# Patient Record
Sex: FEMALE | Race: WHITE | NOT HISPANIC OR LATINO | Employment: OTHER | URBAN - METROPOLITAN AREA
[De-identification: names, ages, dates, MRNs, and addresses within clinical notes are randomized per-mention and may not be internally consistent; named-entity substitution may affect disease eponyms.]

---

## 2019-05-30 ENCOUNTER — TELEPHONE (OUTPATIENT)
Dept: GASTROENTEROLOGY | Facility: CLINIC | Age: 59
End: 2019-05-30

## 2019-05-31 ENCOUNTER — TELEPHONE (OUTPATIENT)
Dept: GASTROENTEROLOGY | Facility: CLINIC | Age: 59
End: 2019-05-31

## 2019-06-14 ENCOUNTER — TELEPHONE (OUTPATIENT)
Dept: GASTROENTEROLOGY | Facility: CLINIC | Age: 59
End: 2019-06-14

## 2023-09-20 DIAGNOSIS — Z12.31 ENCOUNTER FOR SCREENING MAMMOGRAM FOR MALIGNANT NEOPLASM OF BREAST: Primary | ICD-10-CM

## 2023-11-08 ENCOUNTER — TELEPHONE (OUTPATIENT)
Dept: SLEEP MEDICINE | Facility: CLINIC | Age: 63
End: 2023-11-08
Payer: MEDICAID

## 2024-12-16 ENCOUNTER — TELEPHONE (OUTPATIENT)
Dept: PODIATRY | Facility: CLINIC | Age: 64
End: 2024-12-16
Payer: MEDICARE

## 2024-12-16 NOTE — TELEPHONE ENCOUNTER
Called and spoke to patient. Scheduled appointment with patient for 12/18/24. Patient understood date, time, and location.

## 2024-12-18 ENCOUNTER — PATIENT MESSAGE (OUTPATIENT)
Dept: PODIATRY | Facility: CLINIC | Age: 64
End: 2024-12-18
Payer: MEDICARE

## 2024-12-18 ENCOUNTER — OFFICE VISIT (OUTPATIENT)
Dept: PODIATRY | Facility: CLINIC | Age: 64
End: 2024-12-18
Payer: MEDICARE

## 2024-12-18 DIAGNOSIS — M79.671 FOOT PAIN, RIGHT: Primary | ICD-10-CM

## 2024-12-18 DIAGNOSIS — S93.431A SYNDESMOTIC DISRUPTION OF RIGHT ANKLE, INITIAL ENCOUNTER: ICD-10-CM

## 2024-12-18 DIAGNOSIS — S82.841A CLOSED BIMALLEOLAR FRACTURE OF RIGHT ANKLE, INITIAL ENCOUNTER: Primary | ICD-10-CM

## 2024-12-18 PROBLEM — Z47.89: Status: ACTIVE | Noted: 2024-12-18

## 2024-12-18 PROCEDURE — 99999 PR PBB SHADOW E&M-EST. PATIENT-LVL IV: CPT | Mod: PBBFAC,,, | Performed by: PODIATRIST

## 2024-12-18 RX ORDER — TRAMADOL HYDROCHLORIDE 50 MG/1
50 TABLET ORAL EVERY 6 HOURS PRN
Qty: 12 TABLET | Refills: 0 | Status: SHIPPED | OUTPATIENT
Start: 2024-12-18 | End: 2024-12-28

## 2024-12-18 NOTE — PATIENT INSTRUCTIONS
Report to the Same Day Surgery unit on 12/20/24     1. DO NOT EAT OR DRINK ANYTHING AFTER THE MIDNIGHT BEFORE SURGERY     2. Do not drink any alcohol or take any mind-altering drugs 24 hours before surgery.     3. STOP TAKING: Coumadin, Plavix, Pletal, Aggrenox, Aspirin, Aleve, Naproxen, Advil, Motrin, Ibuprofen, Violette Bahama, Celebrex, Allopurinol, and any medications containing aspirin or antiinflammatories N/A unless instructed otherwise by your Primary Care Provider     4. You may take Tylenol and your other medications up until midnight before your surgery.     5. Take the following medications the morning of your procedure with a sip of water (less than an ounce): Effexor,Depakote     6. Leave all valuables at home.     7. Bathe like you normally do the morning of or the night before surgery, preferably with an anti-bacterial soap     8. Only 2 visitors will be allowed on the unit with patients. Children under 12 years old are not allowed to visit on unit.     9. YOU WILL NOT BE ALLOWED TO DRIVE HOME AFTER YOUR PROCEDURE!!! A family member or friend must be in the unit with you to receive discharge instructions and to sign the papers for you to be discharged home. Also, you must make arrangements before your surgery day to have a ride home in a private vehicle (no buses or public transportation allowed). YOU WILL NOT BE ALLOWED TO WALK HOME, NOR WILL STAFF BE RESPONSIBLE FOR MAKING THESE ARRANGEMENTS FOR YOU!!! FAILURE TO MAKE THE PROPER ARRANGEMENTS FOR YOURSELF MAY RESULT IN THE CANCELLATION OF YOUR PROCEDURE!    10. Obtain a History & Physical for surgical clearance for your surgery from your Primary Care Provider within 30 days of your date of surgery. Have their office fax us a copy of the H&P. Bring a paper copy of the H&P with you to surgery.      -------------------------------------------------------------------------------------------------------------------------------------------------------------        Ankle Fracture, Distal Fibula  You have a fracture, or broken bone, of the end of the fibula bone. The fibula is one of two bones that support the ankle joint.        Home care  You will be given a splint, cast, or special boot to prevent movement at the injury site. Do not put weight on a splint. It will break. Follow your healthcare provider's advice about when to begin bearing weight on a cast or boot.  Keep your leg elevated when sitting or lying down. When sleeping, place a pillow under the injured leg. When sitting, support the injured leg so it is level with your waist. This is very important during the first 48 hours.  Keep the cast or splint completely dry at all times. When bathing, protect the cast or splint with 2 large plastic bags. Place 1 bag outside of the other. Tape each bag with duct tape at the top end. Water can still leak in even when the foot is covered. So it's best to keep the cast, splint, or boot away from water. If a fiberglass cast or splint gets wet, dry it with a hair dryer on a cool setting.  Place an ice pack over the injured area for no more than 15 to 20 minutes. Do this every 3 to 6 hours for the first 24 to 48 hours. Continue this 3 to 4 times a day as needed. To make an ice pack, put ice cubes in a plastic bag that seals at the top. Wrap the bag in a clean, thin towel or cloth. Never put ice or an ice pack directly on the skin. The ice pack can be put right on the cast or splint. As the ice melts, be careful that the cast or splint doesn't get wet.  You may use over-the-counter pain medicine to control pain, unless another pain medicine was prescribed. If you have chronic liver or kidney disease or ever had a stomach ulcer or GI bleeding, talk with your provider before using these medicines.  Follow-up  care  Follow up with your healthcare provider in 1 week, or as advised. This is to be sure the bone is healing properly. If you were given a splint, it may be changed to a cast after the swelling goes down.  If X-rays were taken, you will be told of any new findings that may affect your care.  When to seek medical advice  Call your healthcare provider right away if any of these occur:  The plaster cast or splint becomes wet or soft  The fiberglass cast or splint stays wet for more than 24 hours  There is increased tightness or pain under the cast or splint  Your toes become swollen, cold, blue, numb, or tingly  The cast becomes loose  The cast has a bad smell  Sore areas develop under the cast  The cast develops cracks or breaks     This information is not intended as a substitute for professional medical care. Always follow your healthcare professional's instructions.      Understanding an Ankle Fracture      The ankle is formed by bones in the lower leg (tibia and fibula) and the bone on top of the foot (talus). When you have a fracture of the ankle, it means that one or more of the bones in the ankle are broken. The bone may be cracked, broken into two or more pieces, or even shattered. The pieces of bone may be lined up or they may have moved out of place. Sometimes, the bone may break through the skin. Nearby ligaments may also be damaged. Depending on how badly the bone is broken, healing may take a few months or longer.   What causes an ankle fracture?  Ankle fractures are often caused from severely twisting or rolling the ankle. They may also be caused from a fall, blow, accident, or sports injury.  Symptoms of an ankle fracture  Symptoms can include pain, swelling, and bruising. If the bone breaks through the skin, bleeding at the site can also occur. The ankle may look crooked, deformed, or bent. Also, it may be hard to move or use the ankle and foot as you would normally.  Treating an ankle  fracture  Treatment for an ankle fracture depends on where the bone is broken and how serious the break is. If needed, the bone is put back into place. This may be done with or without surgery. If surgery is needed, the surgeon may use devices such as pins, plates, or screws to hold the bone together. Usually, you will wear a splint, brace, or short leg cast to keep the bone in place and protect it from injury during healing. Other treatments may also be used to help reduce symptoms or regain function. These include:  Rest. You may need to avoid walking or putting any weight on the broken ankle for a period of time. Severe fractures need a longer limit on weight-bearing activities.  Cold packs. Putting an ice pack over the injured area may help reduce swelling and pain.  Compression. An elastic bandage may be wrapped around the ankle to help reduce swelling.  Elevation. Propping up the ankle so that it is above your heart may ease swelling.  Pain medicines. Prescription or over-the-counter pain medicines may help reduce pain and swelling. If needed, stronger pain medicines may be prescribed.  Exercises. Your healthcare provider may give you certain exercises to do at home or with a physical therapist. These help restore strength, flexibility, and range of motion in the ankle and foot.  Possible complications of an ankle fracture  These can include:  Poor healing of the bone  Weakness, stiffness, or loss of range of motion in the ankle  Osteoarthritis in the ankle  When to call your healthcare provider  Call your healthcare provider right away if you have any of these:  Fever of 100.4°F (38°C) or higher, or as directed  Symptoms that don't get better with treatment, or get worse  Numbness, tingling, or coldness in your foot or toes  Toenails that turn blue or grey in color  A splint, brace, or cast that is damaged or feels too tight or loose  Unusual redness, warmth, swelling, bleeding, or drainage from any wounds or  incision sites  New symptoms     This information is not intended as a substitute for professional medical care. Always follow your healthcare professional's instructions.            Understanding Tibia/Fibula Fracture Open Reduction and Internal Fixation (ORIF)  Open reduction and internal fixation (ORIF) is a type of treatment to fix a broken bone. It puts the pieces of a broken bone back together so they can heal. Open reduction means the bones are put back in place during a surgery. Internal fixation means that special hardware is used to hold the bone pieces together. This helps the bone heal correctly. The procedure is done by an orthopedic surgeon. This is a doctor with special training in treating bone, joint, and muscle problems.  How does a tibia/fibula fracture happen?  The tibia and fibula are the 2 bones of your lower leg. The tibia is your shinbone. It's the larger bone. The fibula is the smaller bone that sits next to the tibia. The top of the tibia forms part of the knee joint. The bottom of both the tibia and the fibula form the upper part of the ankle joint.  An injury may break (fracture) your tibia or your fibula into 2 or more pieces. This might happen near your knee, in the middle of your shin, or near your ankle. A fracture near your ankle may be called a broken ankle. You may have a fracture in 1 or both of the bones. A bone may break but the pieces are still lined up correctly. Or the pieces may not line up correctly. This is called a displaced fracture.  A broken tibia or fibula can happen from a car or bicycle accident, contact sports, a fall, or activities with movements that you do over and over again. Older adults with low bone density (osteoporosis) are more at risk for breaks in these bones.  Why is ORIF done?  During an open reduction, the bone pieces are put back in their proper alignment. The bones are then connected back in place with hardware. This is called internal fixation.  The hardware may include screws, plates, rods, wires, or nails. This helps the bone heal correctly.  Most people do not need ORIF for tibia/fibula fractures. They may have bones put back in place without surgery. They may have pain medicine, a cast, splint, or a special brace, and physical therapy. You are likely to need ORIF if:  You have a displaced fracture.  Part of your tibia or fibula broke through the skin.  Your tibia or fibula broke into several pieces.  Your fracture involves your knee or ankle joint.  You had other treatment, but your fracture did not heal well.  How is a tibia/fibula fracture ORIF done?  The surgery is done by an orthopedic surgeon. The surgeon will make a cut (incision) through the skin and muscle of your leg. He or she will put the pieces of your tibia and fibula back into place (reduction). The pieces of the broken bones will be secured to each other (fixation). Your doctor may use screws, metal plates, wires, or pins. For a fracture in the middle of the tibia, a special metal shiloh may be put through the middle of the bone.  What are the risks of tibia/fibula fracture ORIF?  All surgery has risks. The risks of tibia/fibula fracture ORIF include:  Infection  Bleeding  Nerve damage  Bone healing out of alignment  Blood clots  Fat embolism  Broken screws or plates  Loss of movement  Damage to the bones from the hardware  Skin irritation from the hardware  Misaligned bones  Problems from anesthesia  Need for additional surgery  Your risks vary based on your age and general health. For example, if you are a smoker or if you have low bone density, you may have a higher risk for certain problems. People with diabetes that is not controlled well may also have a higher risk for problems. Talk with your healthcare provider about which risks apply most to you.  This information is not intended as a substitute for professional medical care. Always follow your healthcare professional's  instructions.        Having Tibia/Fibula Fracture Open Reduction and Internal Fixation (ORIF)  Open reduction and internal fixation (ORIF) is a type of treatment to fix a broken bone. It puts the pieces of a broken bone back together so they can heal. Open reduction means the bones are put back in place during a surgery. Internal fixation means that special hardware is used to hold the bone pieces together. This helps the bone heal correctly. The procedure is done by an orthopedic surgeon. This is a doctor with special training in treating bone, joint, and muscle problems.  What to tell your healthcare provider  Tell your healthcare provider about all the medicines you take. This includes over-the-counter medicines such as ibuprofen. It also includes vitamins, herbs, and other supplements. Also tell your provider the last time you had something to eat or drink. And tell your provider if you:  Have had any recent changes in your health, such as an infection or fever  Are sensitive or allergic to any medicines, latex, tape, or anesthesia (local and general)  Are pregnant or think you may be pregnant  Tests before your surgery  You may need an X-ray or other imaging scan to look at your bones.  Getting ready for your surgery  ORIF often takes place as emergency surgery after an accident or injury. Before this procedure, a healthcare provider will ask about your health history and give you a physical exam.  In some cases, femur fracture ORIF is planned. You may need to have your leg placed in traction while you wait for surgery. Traction is a type of sling that holds your leg. Talk with your healthcare provider how to get ready for your surgery. You may need to stop taking some medicines before the procedure, such as blood thinners and aspirin. If you smoke, you may need to stop before your surgery. Smoking can delay healing. Talk with your healthcare provider if you need help to stop smoking.  Also, make sure to:  Ask  a family member or friend to take you home from the hospital. You cannot drive yourself.  Plan some changes at home to help you recover. You may need help at home.  Not eat or drink after midnight the night before your surgery  Follow all other instructions from your healthcare provider  You may be asked to sign a consent form that gives your permission to do the procedure. Read the form carefully. Ask questions if something is not clear.  On the day of surgery  Your surgeon will explain the details of your surgery. These details will depend on the location and severity of your injury. An orthopedic surgeon and a team of specialized nurses will do the surgery. The preparation and surgery may take a couple of hours. In general, you can expect the following:  You will likely have general anesthesia.This will prevent pain and make you sleep through the surgery. Or you may have regional anesthesia to numb the area and medicine to help you relax and sleep through the surgery.  A healthcare provider watches your vital signs, like your heart rate and blood pressure, during the surgery.  After cleaning the skin, the surgeon will make a cut (incision) through the skin and muscle of your leg.  The surgeon will put the pieces of your tibia and fibula back into place (reduction).  He or she will secure the pieces of the broken bones to each other (fixation). The surgeon may use screws, metal plates, wires, or pins. For a fracture in the middle of our tibia, a special metal shiloh may be put through the middle of the bone.  The surgeon will make other repairs to the area as needed.  He or she will close the layers of muscle and skin around your thigh with sutures.  After your surgery  Talk with your surgeon about what you can expect after your surgery. You may go home the same day. Or you may stay overnight in the hospital. Before leaving the hospital, you will likely have X-rays taken of your leg. This is to check the repair.  You  "will have some pain after the surgery. Your doctor will tell you what pain medicine you can take to help reduce the pain. You can also use ice packs to help lessen pain and swelling. You might have some fluid draining from your incision. This is normal.  You'll get instructions about how to move your leg and when you can put weight on it. For a while after your surgery, you may be told not to move your leg. You may need to wear a brace for several weeks. You may need to keep your leg dry.  Follow all of your doctor's instructions carefully. Your surgeon may tell you to:  Take prescription medicine to prevent blood clots  Not take certain over-the-counter medicines for pain that may interfere with bone healing  Eat foods high in calcium and vitamin D to help with bone healing  Follow-up care  Make sure to keep all of your follow-up appointments. You may need to have your stitches removed a week or so after your surgery.  You may have physical therapy to improve the strength and movement of your leg. The therapy may include treatments and exercises. The therapy improves your chances of a full recovery. Most people are able to return to all their normal activities within a few months.  When to call your healthcare provider  Call your healthcare provider right away if you have any of these:  Fever of 100.4°F (38°C) or higher  Redness, swelling, or fluid leaking from your incision that gets worse  Pain in your leg or calf that gets worse  Loss of feeling in your foot or leg     This information is not intended as a substitute for professional medical care. Always follow your healthcare professional's instructions.      Crutch Use    Sizing Crutches    Even if you have already been fitted for crutches, make sure your crutch pads and handgrips are set at the proper distance, as follows:          Crutch pad distance from armpits: The crutch pads (tops of crutches) should be 1½" to 2" (about two finger widths) below the " "armpits, with the shoulders relaxed.  Handgrip: Place it so your elbow is slightly bent--enough so you can fully extend your elbow when you take a step.  Crutch length (top to bottom): The total crutch length should equal the distance from your armpit to about 6" in front of a shoe.     Begin in the Tripod Position  The tripod position is the position in which you stand when using crutches. It is also the position in which you begin walking. To get into the tripod position, place the crutch tips about 4" to 6" to the side and in front of each foot. Stand on your good foot (the one that is weightbearing).        Walking with Crutches  (Nonweightbearing)    If your foot and ankle surgeon has told you to avoid all weightbearing, you will need sufficient upper-body strength to support all your weight with just your arms and shoulders.    Begin in the tripod position, remembering to keep all your weight on your good (weightbearing) foot.  Advance both crutches and the affected foot/leg.  Move the good weightbearing foot/leg forward (beyond the crutches).  Advance both crutches and then the affected foot/leg.  Repeat Steps 3 and 4.     Managing Chairs with Crutches  To get into and out of a chair safely:    Make sure the chair is stable and will not roll or slide. It must have arms and back support.  Stand with the backs of your legs touching the front of the seat.  Place both crutches in one hand, grasping them by the handgrips.  Hold on to the crutches (on one side) and the chair arm (on the other side) for balance and stability while lowering yourself to a seated position or raising yourself from the chair to stand up.     Managing Stairs without Crutches  The safest way to go up and down stairs is to use your seat, not your crutches.    To go up stairs:  Seat yourself on a low step.  Move your crutches upstairs by one of these methods:  If distance and reach allow, place the crutches at the top of the staircase.  If " "this is not possible, place crutches as far up the stairs as you can, and then move them to the top as you progress up the stairs. In the seated position, reach behind you with both arms. Use your arms and weightbearing foot/leg to lift yourself up one step. Repeat this process one step at a time. (Remember to move the crutches to the top of the staircase if you have not already done so.)     To go down stairs:  Seat yourself on the top step. Move your crutches downstairs by sliding them to the lowest possible point on the stairway. Then continue to move them down as you progress down the stairs. In the seated position, reach behind you with both arms. Use your arms and weightbearing foot/leg to lift yourself down one step. Repeat this process one step at a time. (Remember to move the crutches to the bottom of the staircase if you have not already done so.)          IMPORTANT!  Follow These Rules for Safety and Comfort  Dont look down. Look straight ahead as you normally do when you walk.  Dont use crutches if you feel dizzy or drowsy.  Dont walk on slippery surfaces. Avoid snowy, icy or rainy conditions.   Dont put any weight on the affected foot if your doctor has so advised.  Do make sure your crutches have rubber tips.  Do wear well-fitting, low-heel shoes (or shoe).  Do position the crutch hand  correctly (see Sizing Your Crutches)  Do keep the crutch pads 1½" to 2" below your armpits.  Do call your foot and ankle surgeon if you have any questions or difficulties.      "

## 2024-12-18 NOTE — PROGRESS NOTES
[facility] - PODIATRY  [address]  [address]  [place] [address]  Dept: [phone]  Dept Fax: [phone]    Fredis Dorantes Jr., DPM   Fredis Dorantes Jr.     New Patient Visit  Assessment:     1. Closed bimalleolar fracture of right ankle, initial encounter  Ambulatory Referral/Consult to Physical Therapy        1. Closed bimalleolar fracture of right ankle, initial encounter  -     Ambulatory Referral/Consult to Physical Therapy; Future; Expected date: 12/25/2024      Plan:     MDM    Coding    - pt seen evaluated and treated  - discussed surgical and conservative tx options  - all alternatives, risks, benefits of all tx options were discussed in detail  -XR reviewed   Medial mal avulsion fx with lateral oblique fracture that extends above the level of the ankle joint. 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  -clinically she s/s concerning for instability and syndesmosis injury  -given failure of conservative measures/ severity of deformity / severity of injury, we discussed surgical intervention and -pt would benefit from operative intervention: we discussed stress exam under anesthesia with likely ORIF of R ankle fracture and syndesmosis  -given failure of conservative measures, we discussed surgical intervention  -we discussed approx postop course  -would be out pt, elective surgery  -would GA + block, supine position  -would need anesthesia clearance  -labs and xr on way out  -DOS: 12/20/24    - rx dispensed: rxs to be given on day of surgery  - referrals: PT crutch  -I discussed the following treatment options: Surgical options discussed closed reduction of right bimalleolar fracture, open reduction of right bimalleolar fracture  Continue use of assistive devices  Weight bearing restriction: NWB  -Questions solicited and answered  Cast care principles discussed  Fracture care principles discussed  Patient education pamphlet shared and discussed and sent  with patient  Infection precautions reiterated on all surgical patients     Follow up in about 3 weeks (around 1/8/2025).    Subjective:      Patient ID: Sandee Barriga is a 64 y.o. female.    Chief Complaint:   Chief Complaint   Patient presents with    Ankle Injury       DOI: 12/14/24    CC - ankle/foot injury: Patient complains of injury to the right ankle/foot. This is evaluated as a personal injury. The injury occurred a few days ago, and occurred while walking on road.  The patient states the ankle/foot rolled inward at the time of injury.  Patient did hear or sense a pop or snap at the time of the injury. The patient notes pain and moderate swelling of the foot/ankle since the injury. Patient has treated the ankle with ice. Pain is localized to the anterior, medial, lateral area. Patients rates pain 7/10 on pain scale.    Able to ambulate: no    HPI      Outside reports reviewed: historical medical records.  Family hx: as below  Past Medical History:   Diagnosis Date    Arthritis     Asthma      No past surgical history on file.  No family history on file.  Current Outpatient Medications   Medication Sig Dispense Refill    traMADoL (ULTRAM) 50 mg tablet Take 1 tablet (50 mg total) by mouth every 6 (six) hours as needed for Pain. 12 tablet 0     No current facility-administered medications for this visit.     Review of patient's allergies indicates:   Allergen Reactions    Ambien dalton Hallucinations    Iodine Hives     Social History     Socioeconomic History    Marital status:    Tobacco Use    Smoking status: Unknown       ROS    REVIEW OF SYSTEMS: Negative as documented below as well as positive findings in bold.       Constitutional  Respiratory  Gastrointestinal  Skin   - Fever - Cough - Heartburn - Rash   - Chills - Spit blood - Nausea - Itching   - Weight Loss - Shortness of breath - Vomiting - Nail pain   - Malaise/Fatigue - Wheezing - Abdominal Pain  Wound/Ulcer   - Weight Gain   - Blood in  Stool  Poor wound healing       - Diarrhea          Cardiovascular  Genitourinary  Neurological  HEENT   - Chest Pain - Dysuria - Dizziness - Headache   - Palpitations - Hematuria - Tingling - Congestion   - Pain at night in legs - Flank Pain - Tremor - Sore Throat   - Cramping   - Sensory Change - Blurred Vision   - Leg Swelling   - Speech Change - Double Vision   - Dizzy when standing   - Focal Weakness - Eye Redness       - Seizures - Dry Eyes       - Loss of Consciousness          Endocrine  Musculoskeletal  Psychiatric   - Cold intolerance - Muscle Pain - Depression   - Heat intolerance - Neck Pain - Insomnia   - Anemia - Joint Pain - Memory Loss   -  Easy bruising, bleeding - Heel pain - Anxiety      Toe Pain        Leg/Ankle/Foot Pain         Objective:     There were no vitals taken for this visit.    Physical Exam    General Appearance:   Patient appears well developed, well nourished  Patient appears stated age    Psychiatric:   Patient is oriented to time, place, and person  Patient has appropriate mood and affect    Neck:  Trachea Midline  No visible masses    Respiratory/Ears:  No distress or labored breathing.  Able to differentiate between normal talking voice and whisper.  Able to follow commands    Eyes:  Visual Acuity intact  Lids and conjunctivae normal. No discoloration noted.    Foot/Ankle Musculoskeletal Exam  Gait    Antalgic: right    Limp: right    Assistive device: crutches    Inspection    Right      Effusion: moderate        Ecchymosis: large      Palpation    Right      Crepitus: moderate        Tenderness: present          Anterior syndesmosis: moderate          Distal fibula: severe          Proximal fibula: mild          Medial malleolus: moderate      Right Ankle Exam     Tenderness   The patient is experiencing tenderness in the lateral malleolus and medial malleolus.  Swelling: moderate           Foot Exam    Right Foot/Ankle     Tests  Syndesmosis squeeze: positive           R LE  exam con't:  V:  DP 2/4, PT 2/4   CRT< 3s to all digits tested   Tibial and popliteal lymph nodes are w/o abnormality   Edema absent, varicose veins present    N: Patient displays normal ankle reflexes   SILT in SP/DP/T/Darlyn/Saph distributions    Ortho: +Motor EHL/FHL/TA/GA   +TTP medial, lateral and anterior ankle    +TTP syndesmosis    Ecchymosis present lateral leg and ankle   Instability: yes at syndesmosis   Compartments soft/compressible. No pain on passive stretch of big toe. No calf  Pain.    Derm: skin is intact, skin warm and dry, skin without ulcers or lesions, skin without induration, nails normal          Imaging / Labs:    X-Ray Ankle Complete Right    Result Date: 12/14/2024  EXAMINATION: XR ANKLE COMPLETE 3 VIEW RIGHT CLINICAL HISTORY: Unspecified fall, initial encounter TECHNIQUE: AP, lateral, and oblique images of the right ankle were performed. COMPARISON: None FINDINGS: Obliquely oriented fracture through the lateral malleolus extending to the tibial plafond. Mildly displaced fracture at the tip of the medial malleolus. Talar dome appears intact.  Ankle mortise symmetric.  No syndesmotic widening. No large joint effusion. No radiopaque foreign body.  Enthesopathic change at the calcaneus.     Acute appearing bimalleolar fracture, as discussed. Electronically signed by: Antoine Craft Date:    12/14/2024 Time:    10:57        Note: This was dictated using a computer transcription program. Although proofread, it may contain computer transcription errors and phonetic errors. Other human proofreading errors may also exist. Corrections may be performed at a later time. Please contact us for any clarification if needed.    Fredis Dorantes DPM  [facility]

## 2024-12-20 PROBLEM — S93.431A SYNDESMOTIC DISRUPTION OF RIGHT ANKLE: Status: ACTIVE | Noted: 2024-12-20

## 2024-12-20 PROBLEM — S82.841A CLOSED BIMALLEOLAR FRACTURE OF RIGHT ANKLE: Status: ACTIVE | Noted: 2024-12-20

## 2024-12-23 ENCOUNTER — TELEPHONE (OUTPATIENT)
Dept: PODIATRY | Facility: CLINIC | Age: 64
End: 2024-12-23
Payer: MEDICARE

## 2024-12-23 NOTE — TELEPHONE ENCOUNTER
Called patient, no answer. Left voicemail stating medication is being transferred from [facility] to [facility] pharmacy now. Provided callback number.

## 2024-12-26 ENCOUNTER — TELEPHONE (OUTPATIENT)
Dept: PODIATRY | Facility: CLINIC | Age: 64
End: 2024-12-26
Payer: MEDICARE

## 2024-12-26 NOTE — TELEPHONE ENCOUNTER
Called patient back in regards to message below and spoke to patient. Patient stated that she cannot make it to appt on the 31st, patient stated there is not medical transportation and her kids are unable to bring her so she has no ride. Informed her that I will still keep the appointment just in case she is able to make it and I will speak to Dr. Dorantes once he gets back in office on the 31st to see if he needs to see her earlier. Patient understood.       What is the request in detail:  patient request call back in reference to reschedule appointment Please contact to further discuss and advise      Can the clinic reply by [facility]:     What Number to Call Back [facility]:   [phone]

## 2024-12-27 ENCOUNTER — TELEPHONE (OUTPATIENT)
Dept: PODIATRY | Facility: CLINIC | Age: 64
End: 2024-12-27
Payer: MEDICARE

## 2024-12-31 ENCOUNTER — OFFICE VISIT (OUTPATIENT)
Dept: PODIATRY | Facility: CLINIC | Age: 64
End: 2024-12-31
Payer: MEDICARE

## 2024-12-31 VITALS — BODY MASS INDEX: 32.06 KG/M2 | RESPIRATION RATE: 18 BRPM | HEIGHT: 65 IN | WEIGHT: 192.44 LBS

## 2024-12-31 DIAGNOSIS — Z47.89 AFTERCARE FOLLOWING SURGERY OF THE MUSCULOSKELETAL SYSTEM: Primary | ICD-10-CM

## 2024-12-31 DIAGNOSIS — S93.431A SYNDESMOTIC DISRUPTION OF RIGHT ANKLE, INITIAL ENCOUNTER: ICD-10-CM

## 2024-12-31 PROCEDURE — 1101F PT FALLS ASSESS-DOCD LE1/YR: CPT | Mod: CPTII,S$GLB,, | Performed by: PODIATRIST

## 2024-12-31 PROCEDURE — 1159F MED LIST DOCD IN RCRD: CPT | Mod: CPTII,S$GLB,, | Performed by: PODIATRIST

## 2024-12-31 PROCEDURE — 99024 POSTOP FOLLOW-UP VISIT: CPT | Mod: S$GLB,,, | Performed by: PODIATRIST

## 2024-12-31 PROCEDURE — 99999 PR PBB SHADOW E&M-EST. PATIENT-LVL IV: CPT | Mod: PBBFAC,,, | Performed by: PODIATRIST

## 2024-12-31 PROCEDURE — 3288F FALL RISK ASSESSMENT DOCD: CPT | Mod: CPTII,S$GLB,, | Performed by: PODIATRIST

## 2024-12-31 RX ORDER — HYDROCODONE BITARTRATE AND ACETAMINOPHEN 5; 325 MG/1; MG/1
1 TABLET ORAL EVERY 6 HOURS PRN
Qty: 25 TABLET | Refills: 0 | Status: SHIPPED | OUTPATIENT
Start: 2024-12-31

## 2024-12-31 NOTE — PATIENT INSTRUCTIONS
R.I.C.E.    R.I.C.E. stands for Rest, Ice, Compression, and Elevation. Doing these things helps limit pain and swelling after an injury. R.I.C.E. also helps injuries heal faster. Use R.I.C.E. for sprains, strains, and severe bruises or bumps. Follow the tips on this handout and begin R.I.C.E. as soon as possible after an injury.     Rest  Pain is your body's way of telling you to rest an injured area. Whether you have hurt an elbow, hand, foot, or knee, limiting its use will prevent further injury and help you heal.     Ice  Applying ice right after an injury helps prevent swelling and reduce pain. Don't place ice directly on your skin.  Wrap a cold pack or bag of ice in a thin cloth. Place it over the injured area.  Ice for 10 minutes every 3 hours. Don't ice for more than 20 minutes at a time.     Compression  Putting pressure (compression) on an injury helps prevent swelling and provides support.  Wrap the injured area firmly with an elastic bandage. If your hand or foot tingles, becomes discolored, or feels cold to the touch, the bandage may be too tight. Rewrap it more loosely.  If your bandage becomes too loose, rewrap it.  Do not wear an elastic bandage overnight.    Elevation  Keeping an injury elevated helps reduce swelling, pain, and throbbing. Elevation is most effective when the injury is kept elevated higher than the heart.     Call your healthcare provider if you notice any of the following:  Fingers or toes feel numb, are cold to the touch, or change color  Skin looks shiny or tight  Pain, swelling, or bruising worsens and is not improved with elevation         Bone Healing  How Does a Bone Heal?    All broken bones go through the same healing process. This is true whether a bone has been cut as part of a surgical procedure or fractured through an injury.     The bone healing process has three overlapping stages: inflammation, bone production and bone remodeling.        Inflammation starts immediately  after the bone is fractured and lasts for several days. When the bone is fractured, there is bleeding into the area, leading to inflammation and clotting of blood at the fracture site. This provides the initial structural stability and framework for producing new bone.        Bone production begins when the clotted blood formed by inflammation is replaced with fibrous tissue and cartilage (known as soft callus). As healing progresses, the soft callus is replaced with hard bone (known as hard callus), which is visible on x-rays several weeks after the fracture.        Bone remodeling, the final phase of bone healing, goes on for several months. In remodeling, bone continues to form and becomes compact, returning to its original shape. In addition, blood circulation in the area improves. Once adequate bone healing has occurred, weightbearing (such as standing or walking) encourages bone remodeling.?  How Long Does Bone Healing Take?   Bone generally takes 6 to 12 weeks to heal to a significant degree. In general, children's bones heal faster than those of adults. The foot and ankle surgeon will determine when the patient is ready to bear weight on the area. This will depend on the location and severity of the fracture, the type of surgical procedure performed and other considerations.    What Helps Promote Bone Healing?  If a bone will be cut during a planned surgical procedure, some steps can be taken pre- and postoperatively to help optimize healing. The surgeon may offer advice on diet and nutritional supplements that are essential to bone growth. Smoking cessation and adequate control of blood sugar levels in people living with diabetes are important. Smoking and high glucose levels interfere with bone healing.     For all patients with fractured bones, immobilization is a critical part of treatment because any movement of bone fragments slows down the initial healing process. Depending on the type of fracture or  surgical procedure, the surgeon may use some form of fixation (such as screws, plates or wires) on the fractured bone and/or a cast to keep the bone from moving. During the immobilization period, weightbearing is restricted as instructed by the surgeon.     Once the bone is adequately healed, physical therapy often plays a key role in rehabilitation. An exercise program designed for the patient can help in regaining strength and balance and can assist in returning to normal activities.    What Can Hinder Bone Healing?   A wide variety of factors can slow down the healing process. These include:    Movement of the bone fragments; weightbearing too soon  Smoking, which constricts the blood vessels and decreases circulation  Medical conditions, such as diabetes, hormone-related problems or vascular disease  Some medications, such as corticosteroids and other immunosuppressants  Fractures that are severe, complicated or become infected  Advanced age  Poor nutrition or impaired metabolism  Low levels of calcium and vitamin D     How Can Slow Healing Be Treated?   If the bone is not healing as well as expected or fails to heal, the foot and ankle surgeon can choose from a variety of treatment options to enhance bone growth, such as continued immobilization for a longer period, bone stimulation or surgery with bone grafting or use of bone growth proteins      Understanding an Ankle Fracture      The ankle is formed by bones in the lower leg (tibia and fibula) and the bone on top of the foot (talus). When you have a fracture of the ankle, it means that one or more of the bones in the ankle are broken. The bone may be cracked, broken into two or more pieces, or even shattered. The pieces of bone may be lined up or they may have moved out of place. Sometimes, the bone may break through the skin. Nearby ligaments may also be damaged. Depending on how badly the bone is broken, healing may take a few months or longer.   What  causes an ankle fracture?  Ankle fractures are often caused from severely twisting or rolling the ankle. They may also be caused from a fall, blow, accident, or sports injury.  Symptoms of an ankle fracture  Symptoms can include pain, swelling, and bruising. If the bone breaks through the skin, bleeding at the site can also occur. The ankle may look crooked, deformed, or bent. Also, it may be hard to move or use the ankle and foot as you would normally.  Treating an ankle fracture  Treatment for an ankle fracture depends on where the bone is broken and how serious the break is. If needed, the bone is put back into place. This may be done with or without surgery. If surgery is needed, the surgeon may use devices such as pins, plates, or screws to hold the bone together. Usually, you will wear a splint, brace, or short leg cast to keep the bone in place and protect it from injury during healing. Other treatments may also be used to help reduce symptoms or regain function. These include:  Rest. You may need to avoid walking or putting any weight on the broken ankle for a period of time. Severe fractures need a longer limit on weight-bearing activities.  Cold packs. Putting an ice pack over the injured area may help reduce swelling and pain.  Compression. An elastic bandage may be wrapped around the ankle to help reduce swelling.  Elevation. Propping up the ankle so that it is above your heart may ease swelling.  Pain medicines. Prescription or over-the-counter pain medicines may help reduce pain and swelling. If needed, stronger pain medicines may be prescribed.  Exercises. Your healthcare provider may give you certain exercises to do at home or with a physical therapist. These help restore strength, flexibility, and range of motion in the ankle and foot.  Possible complications of an ankle fracture  These can include:  Poor healing of the bone  Weakness, stiffness, or loss of range of motion in the  ankle  Osteoarthritis in the ankle  When to call your healthcare provider  Call your healthcare provider right away if you have any of these:  Fever of 100.4°F (38°C) or higher, or as directed  Symptoms that don't get better with treatment, or get worse  Numbness, tingling, or coldness in your foot or toes  Toenails that turn blue or grey in color  A splint, brace, or cast that is damaged or feels too tight or loose  Unusual redness, warmth, swelling, bleeding, or drainage from any wounds or incision sites  New symptoms     This information is not intended as a substitute for professional medical care. Always follow your healthcare professional's instructions.            Understanding Tibia/Fibula Fracture Open Reduction and Internal Fixation (ORIF)  Open reduction and internal fixation (ORIF) is a type of treatment to fix a broken bone. It puts the pieces of a broken bone back together so they can heal. Open reduction means the bones are put back in place during a surgery. Internal fixation means that special hardware is used to hold the bone pieces together. This helps the bone heal correctly. The procedure is done by an orthopedic surgeon. This is a doctor with special training in treating bone, joint, and muscle problems.  How does a tibia/fibula fracture happen?  The tibia and fibula are the 2 bones of your lower leg. The tibia is your shinbone. It's the larger bone. The fibula is the smaller bone that sits next to the tibia. The top of the tibia forms part of the knee joint. The bottom of both the tibia and the fibula form the upper part of the ankle joint.  An injury may break (fracture) your tibia or your fibula into 2 or more pieces. This might happen near your knee, in the middle of your shin, or near your ankle. A fracture near your ankle may be called a broken ankle. You may have a fracture in 1 or both of the bones. A bone may break but the pieces are still lined up correctly. Or the pieces may not line  up correctly. This is called a displaced fracture.  A broken tibia or fibula can happen from a car or bicycle accident, contact sports, a fall, or activities with movements that you do over and over again. Older adults with low bone density (osteoporosis) are more at risk for breaks in these bones.  Why is ORIF done?  During an open reduction, the bone pieces are put back in their proper alignment. The bones are then connected back in place with hardware. This is called internal fixation. The hardware may include screws, plates, rods, wires, or nails. This helps the bone heal correctly.  Most people do not need ORIF for tibia/fibula fractures. They may have bones put back in place without surgery. They may have pain medicine, a cast, splint, or a special brace, and physical therapy. You are likely to need ORIF if:  You have a displaced fracture.  Part of your tibia or fibula broke through the skin.  Your tibia or fibula broke into several pieces.  Your fracture involves your knee or ankle joint.  You had other treatment, but your fracture did not heal well.  How is a tibia/fibula fracture ORIF done?  The surgery is done by an orthopedic surgeon. The surgeon will make a cut (incision) through the skin and muscle of your leg. He or she will put the pieces of your tibia and fibula back into place (reduction). The pieces of the broken bones will be secured to each other (fixation). Your doctor may use screws, metal plates, wires, or pins. For a fracture in the middle of the tibia, a special metal shiloh may be put through the middle of the bone.  What are the risks of tibia/fibula fracture ORIF?  All surgery has risks. The risks of tibia/fibula fracture ORIF include:  Infection  Bleeding  Nerve damage  Bone healing out of alignment  Blood clots  Fat embolism  Broken screws or plates  Loss of movement  Damage to the bones from the hardware  Skin irritation from the hardware  Misaligned bones  Problems from anesthesia  Need  for additional surgery  Your risks vary based on your age and general health. For example, if you are a smoker or if you have low bone density, you may have a higher risk for certain problems. People with diabetes that is not controlled well may also have a higher risk for problems. Talk with your healthcare provider about which risks apply most to you.  This information is not intended as a substitute for professional medical care. Always follow your healthcare professional's instructions.        Having Tibia/Fibula Fracture Open Reduction and Internal Fixation (ORIF)  Open reduction and internal fixation (ORIF) is a type of treatment to fix a broken bone. It puts the pieces of a broken bone back together so they can heal. Open reduction means the bones are put back in place during a surgery. Internal fixation means that special hardware is used to hold the bone pieces together. This helps the bone heal correctly. The procedure is done by an orthopedic surgeon. This is a doctor with special training in treating bone, joint, and muscle problems.  What to tell your healthcare provider  Tell your healthcare provider about all the medicines you take. This includes over-the-counter medicines such as ibuprofen. It also includes vitamins, herbs, and other supplements. Also tell your provider the last time you had something to eat or drink. And tell your provider if you:  Have had any recent changes in your health, such as an infection or fever  Are sensitive or allergic to any medicines, latex, tape, or anesthesia (local and general)  Are pregnant or think you may be pregnant  Tests before your surgery  You may need an X-ray or other imaging scan to look at your bones.  Getting ready for your surgery  ORIF often takes place as emergency surgery after an accident or injury. Before this procedure, a healthcare provider will ask about your health history and give you a physical exam.  In some cases, femur fracture ORIF is  planned. You may need to have your leg placed in traction while you wait for surgery. Traction is a type of sling that holds your leg. Talk with your healthcare provider how to get ready for your surgery. You may need to stop taking some medicines before the procedure, such as blood thinners and aspirin. If you smoke, you may need to stop before your surgery. Smoking can delay healing. Talk with your healthcare provider if you need help to stop smoking.  Also, make sure to:  Ask a family member or friend to take you home from the hospital. You cannot drive yourself.  Plan some changes at home to help you recover. You may need help at home.  Not eat or drink after midnight the night before your surgery  Follow all other instructions from your healthcare provider  You may be asked to sign a consent form that gives your permission to do the procedure. Read the form carefully. Ask questions if something is not clear.  On the day of surgery  Your surgeon will explain the details of your surgery. These details will depend on the location and severity of your injury. An orthopedic surgeon and a team of specialized nurses will do the surgery. The preparation and surgery may take a couple of hours. In general, you can expect the following:  You will likely have general anesthesia.This will prevent pain and make you sleep through the surgery. Or you may have regional anesthesia to numb the area and medicine to help you relax and sleep through the surgery.  A healthcare provider watches your vital signs, like your heart rate and blood pressure, during the surgery.  After cleaning the skin, the surgeon will make a cut (incision) through the skin and muscle of your leg.  The surgeon will put the pieces of your tibia and fibula back into place (reduction).  He or she will secure the pieces of the broken bones to each other (fixation). The surgeon may use screws, metal plates, wires, or pins. For a fracture in the middle of our  tibia, a special metal shiloh may be put through the middle of the bone.  The surgeon will make other repairs to the area as needed.  He or she will close the layers of muscle and skin around your thigh with sutures.  After your surgery  Talk with your surgeon about what you can expect after your surgery. You may go home the same day. Or you may stay overnight in the hospital. Before leaving the hospital, you will likely have X-rays taken of your leg. This is to check the repair.  You will have some pain after the surgery. Your doctor will tell you what pain medicine you can take to help reduce the pain. You can also use ice packs to help lessen pain and swelling. You might have some fluid draining from your incision. This is normal.  You'll get instructions about how to move your leg and when you can put weight on it. For a while after your surgery, you may be told not to move your leg. You may need to wear a brace for several weeks. You may need to keep your leg dry.  Follow all of your doctor's instructions carefully. Your surgeon may tell you to:  Take prescription medicine to prevent blood clots  Not take certain over-the-counter medicines for pain that may interfere with bone healing  Eat foods high in calcium and vitamin D to help with bone healing  Follow-up care  Make sure to keep all of your follow-up appointments. You may need to have your stitches removed a week or so after your surgery.  You may have physical therapy to improve the strength and movement of your leg. The therapy may include treatments and exercises. The therapy improves your chances of a full recovery. Most people are able to return to all their normal activities within a few months.  When to call your healthcare provider  Call your healthcare provider right away if you have any of these:  Fever of 100.4°F (38°C) or higher  Redness, swelling, or fluid leaking from your incision that gets worse  Pain in your leg or calf that gets worse  Loss  "of feeling in your foot or leg     This information is not intended as a substitute for professional medical care. Always follow your healthcare professional's instructions.      Crutch Use    Sizing Crutches    Even if you have already been fitted for crutches, make sure your crutch pads and handgrips are set at the proper distance, as follows:          Crutch pad distance from armpits: The crutch pads (tops of crutches) should be 1½" to 2" (about two finger widths) below the armpits, with the shoulders relaxed.  Handgrip: Place it so your elbow is slightly bent--enough so you can fully extend your elbow when you take a step.  Crutch length (top to bottom): The total crutch length should equal the distance from your armpit to about 6" in front of a shoe.     Begin in the Tripod Position  The tripod position is the position in which you stand when using crutches. It is also the position in which you begin walking. To get into the tripod position, place the crutch tips about 4" to 6" to the side and in front of each foot. Stand on your good foot (the one that is weightbearing).        Walking with Crutches  (Nonweightbearing)    If your foot and ankle surgeon has told you to avoid all weightbearing, you will need sufficient upper-body strength to support all your weight with just your arms and shoulders.    Begin in the tripod position, remembering to keep all your weight on your good (weightbearing) foot.  Advance both crutches and the affected foot/leg.  Move the good weightbearing foot/leg forward (beyond the crutches).  Advance both crutches and then the affected foot/leg.  Repeat Steps 3 and 4.     Managing Chairs with Crutches  To get into and out of a chair safely:    Make sure the chair is stable and will not roll or slide. It must have arms and back support.  Stand with the backs of your legs touching the front of the seat.  Place both crutches in one hand, grasping them by the handgrips.  Hold on to the " "crutches (on one side) and the chair arm (on the other side) for balance and stability while lowering yourself to a seated position or raising yourself from the chair to stand up.     Managing Stairs without Crutches  The safest way to go up and down stairs is to use your seat, not your crutches.    To go up stairs:  Seat yourself on a low step.  Move your crutches upstairs by one of these methods:  If distance and reach allow, place the crutches at the top of the staircase.  If this is not possible, place crutches as far up the stairs as you can, and then move them to the top as you progress up the stairs. In the seated position, reach behind you with both arms. Use your arms and weightbearing foot/leg to lift yourself up one step. Repeat this process one step at a time. (Remember to move the crutches to the top of the staircase if you have not already done so.)     To go down stairs:  Seat yourself on the top step. Move your crutches downstairs by sliding them to the lowest possible point on the stairway. Then continue to move them down as you progress down the stairs. In the seated position, reach behind you with both arms. Use your arms and weightbearing foot/leg to lift yourself down one step. Repeat this process one step at a time. (Remember to move the crutches to the bottom of the staircase if you have not already done so.)          IMPORTANT!  Follow These Rules for Safety and Comfort  Dont look down. Look straight ahead as you normally do when you walk.  Dont use crutches if you feel dizzy or drowsy.  Dont walk on slippery surfaces. Avoid snowy, icy or rainy conditions.   Dont put any weight on the affected foot if your doctor has so advised.  Do make sure your crutches have rubber tips.  Do wear well-fitting, low-heel shoes (or shoe).  Do position the crutch hand  correctly (see Sizing Your Crutches)  Do keep the crutch pads 1½" to 2" below your armpits.  Do call your foot and ankle surgeon if " you have any questions or difficulties.

## 2024-12-31 NOTE — PROGRESS NOTES
[facility]  [address] [facility]  [address]  [facility] [place] [address]  Dept: [phone]  Dept Fax: [phone]    CLIFTON Olson Jr., Jr.     Post-Operative Visit  Assessment:     1. Aftercare following surgery of the musculoskeletal system        2. Syndesmotic disruption of right ankle, initial encounter  HYDROcodone-acetaminophen (NORCO) 5-325 mg per tablet          Plan:   MDM    Coding    - pt seen evaluated and managed  - skin not ready for suture removal  - dressings re-applied  - wb: nwb RLE in CAM  - rx dispensed: norco renewed  - referrals: none      Follow up in about 1 week (around 1/7/2025).      Subjective:      Patient ID: Sandee Barriga is a 64 y.o. female.    Chief Complaint:   Chief Complaint   Patient presents with    Post-op Evaluation     Vitals:    12/31/24 1043   Resp: 18       DOS: 12/20/24  Procedure: ORIF R ankle fx + repair of syndesmosis    Sandee Barriga returns to the clinic today for the 1st postop visit. Pt is s/p above procedure. Sandee Barriga rates pain at a 7/10 on visual analog scale. Denies n/v/f/c.    HPI      Outside reports reviewed: historical medical records.    Past Medical History:   Diagnosis Date    Anxiety     Arthritis     osteoarthritis    Asthma     Closed fracture of distal end of right fibula     Hypertension     history of; was on meds but blood pressure resolved due to    Insomnia     Major depressive disorder     Panic disorder with agoraphobia     none recent due to taking medication for    Pseudodementia     resolved with medication changes/increase    Syndesmotic disruption of right ankle      Past Surgical History:   Procedure Laterality Date    BILATERAL TUBAL LIGATION      FIXATION OF SYNDESMOSIS OF ANKLE Right 12/20/2024    Procedure: FIXATION, SYNDESMOSIS, ANKLE;  Surgeon: Fredis Dorantes Jr., DPM;  Location: [facility];  Service: Podiatry;  Laterality: Right;    MOUTH SURGERY  06/2023    removal of  all teeth    OPEN REDUCTION AND INTERNAL FIXATION (ORIF) OF INJURY OF ANKLE Right 12/20/2024    Procedure: ORIF, ANKLE;  Surgeon: Fredis Dorantes Jr., DPM;  Location: [facility];  Service: Podiatry;  Laterality: Right;  pop saph    OTHER SURGICAL HISTORY Right     bitten by brown recluse spider and surgery to remove dead flesh from leg    TONSILLECTOMY      at age 4     No family history on file.  Current Outpatient Medications   Medication Sig Dispense Refill    ascorbic acid, vitamin C, (VITAMIN C) 500 MG tablet Take 1 tablet (500 mg total) by mouth once daily. 30 tablet 1    aspirin 325 MG tablet Take 1 tablet (325 mg total) by mouth 2 (two) times a day. 120 tablet 0    busPIRone (BUSPAR) 10 MG tablet Take 10 mg by mouth once daily.      busPIRone (BUSPAR) 10 MG tablet Take 5 mg by mouth every evening.      calcium-vitamin D tablet 600 mg-200 units Take 1 tablet by mouth 2 (two) times daily. Hold until after sx      clonazePAM (KLONOPIN) 1 MG tablet Take 1 mg by mouth daily as needed for Anxiety.      gabapentin (NEURONTIN) 100 MG capsule Take 1 capsule (100 mg total) by mouth 3 (three) times daily. 90 capsule 11    HYDROcodone-acetaminophen (NORCO) 5-325 mg per tablet Take 1 tablet by mouth every 6 (six) hours as needed for Pain. 25 tablet 0    meloxicam (MOBIC) 15 MG tablet Take 15 mg by mouth once daily. Instructed to hold dos      montelukast (SINGULAIR) 10 mg tablet Take 10 mg by mouth once daily.      ondansetron (ZOFRAN-ODT) 4 MG TbDL Take 2 tablets (8 mg total) by mouth every 8 (eight) hours as needed (nausea). 30 tablet 0    venlafaxine 225 mg TR24 Take 1 tablet by mouth Daily.       No current facility-administered medications for this visit.     Review of patient's allergies indicates:   Allergen Reactions    Shellfish containing products Anaphylaxis    Ambien [zolpidem] Hallucinations    Iodine Hives    Cinnamon analogues Other (See Comments)     sneezing    Cottonseed oil Photosensitivity and Rash      sneezing    House dust Other (See Comments)     sneezing    Ragweed Other (See Comments)     As well as other weeds/trees-sneezing     Social History     Socioeconomic History    Marital status:    Tobacco Use    Smoking status: Never    Smokeless tobacco: Never   Substance and Sexual Activity    Alcohol use: Yes     Comment: occasionally    Drug use: Yes     Types: Marijuana     Comment: occasional as needed for arthritis pain    Sexual activity: Yes     Partners: Male     Social Drivers of Health     Financial Resource Strain: Low Risk  (12/24/2024)    Overall Financial Resource Strain (CARDIA)     Difficulty of Paying Living Expenses: Not very hard   Food Insecurity: Food Insecurity Present (12/24/2024)    Hunger Vital Sign     Worried About Running Out of Food in the Last Year: Sometimes true     Ran Out of Food in the Last Year: Sometimes true   Physical Activity: Insufficiently Active (12/24/2024)    Exercise Vital Sign     Days of Exercise per Week: 4 days     Minutes of Exercise per Session: 30 min   Stress: Stress Concern Present (12/24/2024)    [employer] - Occupational Stress Questionnaire     Feeling of Stress : Rather much   Housing Stability: Unknown (12/24/2024)    Housing Stability Vital Sign     Unable to Pay for Housing in the Last Year: No       ROS  REVIEW OF SYSTEMS: Negative as documented below as well as positive findings in bold.       Constitutional  Respiratory  Gastrointestinal  Skin   - Fever - Cough - Heartburn - Rash   - Chills - Spit blood - Nausea - Itching   - Weight Loss - Shortness of breath - Vomiting - Nail pain   - Malaise/Fatigue - Wheezing - Abdominal Pain  Wound/Ulcer   - Weight Gain   - Blood in Stool         - Diarrhea          Cardiovascular  Genitourinary  Neurological  HEENT   - Chest Pain - Dysuria - Dizziness - Headache   - Palpitations - Hematuria - Tingling - Congestion   - Pain at night in legs - Flank Pain - Tremor - Sore  "Throat   - Cramping   - Sensory Change - Blurred Vision   - Leg Swelling   - Speech Change - Double Vision   - Dizzy when standing   - Focal Weakness - Eye Redness       - Seizures - Dry Eyes       - Loss of Consciousness          Endocrine  Musculoskeletal  Psychiatric   - Cold intolerance - Muscle Pain - Depression   - Heat intolerance - Neck Pain - Insomnia   - Anemia - Joint Pain - Memory Loss   -  Easy bruising, bleeding - Heel pain - Anxiety      Toe Pain        Leg/Ankle/Foot Pain         Objective:     Resp 18   Ht 5' 5" (1.651 m)   Wt 87.3 kg (192 lb 7.4 oz)   BMI 32.03 kg/m²     Physical Exam    Neck:  Trachea Midline  No visible masses    Respiratory/Ears:  No distress or labored breathing.  Able to differentiate between normal talking voice and whisper.  Able to follow commands    Eyes:  Visual Acuity intact  No discoloration noted.    Physical Exam  Ortho Exam  Foot Exam    R LE exam con't:  V: DP 2/4, PT 2/4, CRT< 3s to all digits tested.    N: SILT in SP/DP/T/Darlyn/Saph distributions    Ortho: +Motor EHL/FHL/TA/GA   Surgical site pain present as expected   Surgical site swelling present and moderate   Compartments soft/compressible. No pain on passive stretch of big toe. No calf  Pain.     Derm: Skin intact. Sutures/staples: intact. Signs of infection: none.     Imaging / Labs:    Lab Results   Component Value Date    WBC 7.22 12/18/2024    PREALBUMIN 20 12/18/2024       Lab Results   Component Value Date    PREALBUMIN 20 12/18/2024       X-Ray Ankle Complete Right    Result Date: 12/20/2024  EXAMINATION: XR ANKLE COMPLETE 3 VIEW RIGHT CLINICAL HISTORY: postop; TECHNIQUE: AP, lateral, and oblique images of the right ankle were performed. COMPARISON: 12/18/2024. FINDINGS: Postoperative radiographs of the right ankle were obtained in this patient status post open reduction and internal fixation of the right distal fibula with fixation plate and screws in place.  A screw also traverses the distal " tibiofibular syndesmosis.  There are skin staples present.  Small amount of soft tissue air is present consistent with patient's recent surgery.  There is irregularity at the tip of the medial malleolus consistent with patient's history of bimalleolar fracture.  Calcaneal spurs are present at the insertions of the Achilles tendon and plantar fascia.     Status post ORIF of the distal fibula with screw traversing the distal tibiofibular syndesmosis as well. Irregularity of the tip of the medial malleolus consistent with patient's history of bimalleolar fracture. Calcaneal spurs. Electronically signed by: Jeff Alfonso MD Date:    12/20/2024 Time:    16:44    SURG FL Surgery Fluoro Usage    Result Date: 12/20/2024  See OP Notes for results. IMPRESSION: See OP Notes for results. This procedure was auto-finalized by: Virtual Radiologist    X-Ray Chest PA And Lateral    Result Date: 12/20/2024  EXAMINATION: XR CHEST PA AND LATERAL CLINICAL HISTORY: Displaced bimalleolar fracture of right lower leg, initial encounter for closed fracture TECHNIQUE: PA and lateral views of the chest were performed. COMPARISON: None FINDINGS: The cardiac silhouette and superior mediastinal structures are unremarkable. Pulmonary vasculature is within normal limits. The lungs are well aerated and free of focal consolidations. There is no evidence for pneumothorax or pleural effusions. Bony structures are grossly intact.     No acute chest disease identified. Electronically signed by: Jeff Alfonso MD Date:    12/20/2024 Time:    10:50    X-Ray Foot Complete Right    Result Date: 12/20/2024  EXAMINATION: XR FOOT COMPLETE 3 VIEW RIGHT CLINICAL HISTORY: . Pain in right foot TECHNIQUE: Weightbearing AP, lateral, and oblique views of the right foot were performed. COMPARISON: None FINDINGS: The bones of the right foot appear intact.  There is no evidence for acute fracture or bone destruction.  There are degenerative changes within the small joints  of the foot, particularly at the 1st metatarsophalangeal joint.  No bony erosions are identified.  There is mild hallux valgus deformity.  There are calcaneal spurs at the insertions of the Achilles tendon and plantar fascia.  There is circumferential splint material present in this patient with history of bimalleolar fracture.     Circumferential splint in this patient with history of by a malleolar fracture. Bones of the foot appear intact. Mild degenerative changes. Mild hallux valgus deformity. Calcaneal spurs. Electronically signed by: Jeff Alfonso MD Date:    12/20/2024 Time:    10:49    X-Ray Ankle Complete Right    Result Date: 12/20/2024  EXAMINATION: XR ANKLE COMPLETE 3 VIEW RIGHT CLINICAL HISTORY: Pain in right foot TECHNIQUE: AP, lateral, and oblique images of the right ankle were performed. COMPARISON: 12/14/2024. FINDINGS: There is an obliquely oriented fracture of the distal fibula with little interval change in position and alignment when compared to the prior examination.  There also may be a fracture involving the tip of the medial malleolus and the bones demonstrate normal anatomic alignment.  There is no evidence for dislocation.  There is circumferential splint material now present.  Calcaneal spur is noted at the insertion of the Achilles tendon.  There is soft tissue swelling particularly overlying the lateral ankle.     Fractures of the distal fibula and tip of the medial malleolus with no detrimental interval change in position and alignment of the fracture fragments when compared to 12/14/2024. Soft tissue swelling overlying the lateral ankle. Electronically signed by: Jeff Alfonso MD Date:    12/20/2024 Time:    10:44    X-Ray Ankle Complete Right    Result Date: 12/14/2024  EXAMINATION: XR ANKLE COMPLETE 3 VIEW RIGHT CLINICAL HISTORY: Unspecified fall, initial encounter TECHNIQUE: AP, lateral, and oblique images of the right ankle were performed. COMPARISON: None FINDINGS: Obliquely  oriented fracture through the lateral malleolus extending to the tibial plafond. Mildly displaced fracture at the tip of the medial malleolus. Talar dome appears intact.  Ankle mortise symmetric.  No syndesmotic widening. No large joint effusion. No radiopaque foreign body.  Enthesopathic change at the calcaneus.     Acute appearing bimalleolar fracture, as discussed. Electronically signed by: Antoine Craft Date:    12/14/2024 Time:    10:57

## 2025-01-07 ENCOUNTER — PATIENT MESSAGE (OUTPATIENT)
Dept: PODIATRY | Facility: CLINIC | Age: 65
End: 2025-01-07
Payer: MEDICARE

## 2025-01-08 ENCOUNTER — OFFICE VISIT (OUTPATIENT)
Dept: PODIATRY | Facility: CLINIC | Age: 65
End: 2025-01-08
Payer: MEDICARE

## 2025-01-08 VITALS — BODY MASS INDEX: 32.06 KG/M2 | WEIGHT: 192.44 LBS | HEIGHT: 65 IN

## 2025-01-08 DIAGNOSIS — S82.831A CLOSED FRACTURE OF DISTAL END OF RIGHT FIBULA, UNSPECIFIED FRACTURE MORPHOLOGY, INITIAL ENCOUNTER: ICD-10-CM

## 2025-01-08 DIAGNOSIS — M25.571 ACUTE RIGHT ANKLE PAIN: Primary | ICD-10-CM

## 2025-01-08 DIAGNOSIS — Z47.89 AFTERCARE FOLLOWING SURGERY OF THE MUSCULOSKELETAL SYSTEM: Primary | ICD-10-CM

## 2025-01-08 PROCEDURE — 1125F AMNT PAIN NOTED PAIN PRSNT: CPT | Mod: CPTII,S$GLB,, | Performed by: PODIATRIST

## 2025-01-08 PROCEDURE — 99024 POSTOP FOLLOW-UP VISIT: CPT | Mod: S$GLB,,, | Performed by: PODIATRIST

## 2025-01-08 PROCEDURE — 1159F MED LIST DOCD IN RCRD: CPT | Mod: CPTII,S$GLB,, | Performed by: PODIATRIST

## 2025-01-08 PROCEDURE — 99999 PR PBB SHADOW E&M-EST. PATIENT-LVL III: CPT | Mod: PBBFAC,,, | Performed by: PODIATRIST

## 2025-01-08 RX ORDER — TRAMADOL HYDROCHLORIDE AND ACETAMINOPHEN 37.5; 325 MG/1; MG/1
1 TABLET, FILM COATED ORAL EVERY 6 HOURS PRN
Qty: 25 TABLET | Refills: 0 | Status: SHIPPED | OUTPATIENT
Start: 2025-01-08

## 2025-01-08 NOTE — PROGRESS NOTES
[facility] [place] [place]  [address]  [address]  [place] [address]  Dept: [phone]  Dept Fax: [phone]    Fredis Dorantes Jr., DPM   Fredis Dorantes Jr.     Post-Operative Visit  Assessment:     1. Aftercare following surgery of the musculoskeletal system  tramadol-acetaminophen 37.5-325 mg (ULTRACET) 37.5-325 mg Tab          Plan:   MDM    Coding  3 wk s/p     - pt seen evaluated and managed  - XR reviewed   Normal postop changes  - skin healed. Staples removed. Steris applied  - dressings re-applied - keep c/d/I x 1 wk then ok to remove at home and shower like normal  - wb: nwb RLE in [facility]  - rx dispensed: narcotic stepdown to ultracet  - start at home ROM  - referrals: none  -XR b4 nxt visit      Follow up in about 2 weeks (around 1/22/2025).      Subjective:      Patient ID: Sandee Barriga is a 64 y.o. female.    Chief Complaint:   Chief Complaint   Patient presents with    Post-op Evaluation     Post op evaluation / xray of left ankle fracture     There were no vitals filed for this visit.      DOS: 12/20/24  Procedure: ORIF R ankle fx + repair of syndesmosis    Sandee Barriga returns to the clinic today for a postop visit. Pt is s/p above procedure. Sandee Barriga rates pain at a 6/10 on visual analog scale. Denies n/v/f/c.    HPI      Outside reports reviewed: historical medical records.    Past Medical History:   Diagnosis Date    Anxiety     Arthritis     osteoarthritis    Asthma     Closed fracture of distal end of right fibula     Hypertension     history of; was on meds but blood pressure resolved due to    Insomnia     Major depressive disorder     Panic disorder with agoraphobia     none recent due to taking medication for    Pseudodementia     resolved with medication changes/increase    Syndesmotic disruption of right ankle      Past Surgical History:   Procedure Laterality Date    BILATERAL TUBAL LIGATION      FIXATION OF SYNDESMOSIS OF ANKLE Right 12/20/2024     Procedure: FIXATION, SYNDESMOSIS, ANKLE;  Surgeon: Fredis Dorantes Jr., DPM;  Location: [facility] OR;  Service: Podiatry;  Laterality: Right;    MOUTH SURGERY  06/2023    removal of all teeth    OPEN REDUCTION AND INTERNAL FIXATION (ORIF) OF INJURY OF ANKLE Right 12/20/2024    Procedure: ORIF, ANKLE;  Surgeon: Fredis Dorantes Jr., DPM;  Location: [facility] OR;  Service: Podiatry;  Laterality: Right;  pop saph    OTHER SURGICAL HISTORY Right     bitten by brown recluse spider and surgery to remove dead flesh from leg    TONSILLECTOMY      at age 4     No family history on file.  Current Outpatient Medications   Medication Sig Dispense Refill    ascorbic acid, vitamin C, (VITAMIN C) 500 MG tablet Take 1 tablet (500 mg total) by mouth once daily. 30 tablet 1    aspirin 325 MG tablet Take 1 tablet (325 mg total) by mouth 2 (two) times a day. 120 tablet 0    busPIRone (BUSPAR) 10 MG tablet Take 10 mg by mouth once daily.      busPIRone (BUSPAR) 10 MG tablet Take 5 mg by mouth every evening.      calcium-vitamin D tablet 600 mg-200 units Take 1 tablet by mouth 2 (two) times daily. Hold until after sx      clonazePAM (KLONOPIN) 1 MG tablet Take 1 mg by mouth daily as needed for Anxiety.      gabapentin (NEURONTIN) 100 MG capsule Take 1 capsule (100 mg total) by mouth 3 (three) times daily. 90 capsule 11    meloxicam (MOBIC) 15 MG tablet Take 15 mg by mouth once daily. Instructed to hold dos      montelukast (SINGULAIR) 10 mg tablet Take 10 mg by mouth once daily.      ondansetron (ZOFRAN-ODT) 4 MG TbDL Take 2 tablets (8 mg total) by mouth every 8 (eight) hours as needed (nausea). 30 tablet 0    tramadol-acetaminophen 37.5-325 mg (ULTRACET) 37.5-325 mg Tab Take 1 tablet by mouth every 6 (six) hours as needed for Pain. 25 tablet 0    venlafaxine 225 mg TR24 Take 1 tablet by mouth Daily.       No current facility-administered medications for this visit.     Review of patient's allergies indicates:   Allergen Reactions     Shellfish containing products Anaphylaxis    Ambien [zolpidem] Hallucinations    Iodine Hives    Cinnamon analogues Other (See Comments)     sneezing    Cottonseed oil Photosensitivity and Rash     sneezing    House dust Other (See Comments)     sneezing    Ragweed Other (See Comments)     As well as other weeds/trees-sneezing     Social History     Socioeconomic History    Marital status:    Tobacco Use    Smoking status: Never    Smokeless tobacco: Never   Substance and Sexual Activity    Alcohol use: Yes     Comment: occasionally    Drug use: Yes     Types: Marijuana     Comment: occasional as needed for arthritis pain    Sexual activity: Yes     Partners: Male     Social Drivers of Health     Financial Resource Strain: Low Risk  (12/24/2024)    Overall Financial Resource Strain (CARDIA)     Difficulty of Paying Living Expenses: Not very hard   Food Insecurity: Food Insecurity Present (12/24/2024)    Hunger Vital Sign     Worried About Running Out of Food in the Last Year: Sometimes true     Ran Out of Food in the Last Year: Sometimes true   Physical Activity: Insufficiently Active (12/24/2024)    Exercise Vital Sign     Days of Exercise per Week: 4 days     Minutes of Exercise per Session: 30 min   Stress: Stress Concern Present (12/24/2024)    [employer] - Occupational Stress Questionnaire     Feeling of Stress : Rather much   Housing Stability: Unknown (12/24/2024)    Housing Stability Vital Sign     Unable to Pay for Housing in the Last Year: No       ROS  REVIEW OF SYSTEMS: Negative as documented below as well as positive findings in bold.       Constitutional  Respiratory  Gastrointestinal  Skin   - Fever - Cough - Heartburn - Rash   - Chills - Spit blood - Nausea - Itching   - Weight Loss - Shortness of breath - Vomiting - Nail pain   - Malaise/Fatigue - Wheezing - Abdominal Pain  Wound/Ulcer   - Weight Gain   - Blood in Stool         - Diarrhea          Cardiovascular  " Genitourinary  Neurological  HEENT   - Chest Pain - Dysuria - Dizziness - Headache   - Palpitations - Hematuria - Tingling - Congestion   - Pain at night in legs - Flank Pain - Tremor - Sore Throat   - Cramping   - Sensory Change - Blurred Vision   - Leg Swelling   - Speech Change - Double Vision   - Dizzy when standing   - Focal Weakness - Eye Redness       - Seizures - Dry Eyes       - Loss of Consciousness          Endocrine  Musculoskeletal  Psychiatric   - Cold intolerance - Muscle Pain - Depression   - Heat intolerance - Neck Pain - Insomnia   - Anemia - Joint Pain - Memory Loss   -  Easy bruising, bleeding - Heel pain - Anxiety      Toe Pain        Leg/Ankle/Foot Pain         Objective:     Ht 5' 5" (1.651 m)   Wt 87.3 kg (192 lb 7.4 oz)   BMI 32.03 kg/m²     Physical Exam    Neck:  Trachea Midline  No visible masses    Respiratory/Ears:  No distress or labored breathing.  Able to differentiate between normal talking voice and whisper.  Able to follow commands    Eyes:  Visual Acuity intact  No discoloration noted.    Physical Exam  Ortho Exam  Foot Exam    R LE exam con't:  V: DP 2/4, PT 2/4, CRT< 3s to all digits tested.    N: SILT in SP/DP/T/Darlyn/Saph distributions    Ortho: +Motor EHL/FHL/TA/GA   Surgical site pain present and mild   Surgical site swelling present and mild   Compartments soft/compressible. No pain on passive stretch of big toe. No calf  Pain.     Derm: Skin intact. Sutures/staples: intact. Signs of infection: none.     Imaging / Labs:    Lab Results   Component Value Date    WBC 7.22 12/18/2024    PREALBUMIN 20 12/18/2024       Lab Results   Component Value Date    PREALBUMIN 20 12/18/2024       X-Ray Ankle Complete Right    Result Date: 12/20/2024  EXAMINATION: XR ANKLE COMPLETE 3 VIEW RIGHT CLINICAL HISTORY: postop; TECHNIQUE: AP, lateral, and oblique images of the right ankle were performed. COMPARISON: 12/18/2024. FINDINGS: Postoperative radiographs of the right ankle were obtained " in this patient status post open reduction and internal fixation of the right distal fibula with fixation plate and screws in place.  A screw also traverses the distal tibiofibular syndesmosis.  There are skin staples present.  Small amount of soft tissue air is present consistent with patient's recent surgery.  There is irregularity at the tip of the medial malleolus consistent with patient's history of bimalleolar fracture.  Calcaneal spurs are present at the insertions of the Achilles tendon and plantar fascia.     Status post ORIF of the distal fibula with screw traversing the distal tibiofibular syndesmosis as well. Irregularity of the tip of the medial malleolus consistent with patient's history of bimalleolar fracture. Calcaneal spurs. Electronically signed by: Jeff Alfonso MD Date:    12/20/2024 Time:    16:44    SURG FL Surgery Fluoro Usage    Result Date: 12/20/2024  See OP Notes for results. IMPRESSION: See OP Notes for results. This procedure was auto-finalized by: Virtual Radiologist    X-Ray Chest PA And Lateral    Result Date: 12/20/2024  EXAMINATION: XR CHEST PA AND LATERAL CLINICAL HISTORY: Displaced bimalleolar fracture of right lower leg, initial encounter for closed fracture TECHNIQUE: PA and lateral views of the chest were performed. COMPARISON: None FINDINGS: The cardiac silhouette and superior mediastinal structures are unremarkable. Pulmonary vasculature is within normal limits. The lungs are well aerated and free of focal consolidations. There is no evidence for pneumothorax or pleural effusions. Bony structures are grossly intact.     No acute chest disease identified. Electronically signed by: Jeff Alfonso MD Date:    12/20/2024 Time:    10:50    X-Ray Foot Complete Right    Result Date: 12/20/2024  EXAMINATION: XR FOOT COMPLETE 3 VIEW RIGHT CLINICAL HISTORY: . Pain in right foot TECHNIQUE: Weightbearing AP, lateral, and oblique views of the right foot were performed. COMPARISON: None  FINDINGS: The bones of the right foot appear intact.  There is no evidence for acute fracture or bone destruction.  There are degenerative changes within the small joints of the foot, particularly at the 1st metatarsophalangeal joint.  No bony erosions are identified.  There is mild hallux valgus deformity.  There are calcaneal spurs at the insertions of the Achilles tendon and plantar fascia.  There is circumferential splint material present in this patient with history of bimalleolar fracture.     Circumferential splint in this patient with history of by a malleolar fracture. Bones of the foot appear intact. Mild degenerative changes. Mild hallux valgus deformity. Calcaneal spurs. Electronically signed by: Jeff Alfonso MD Date:    12/20/2024 Time:    10:49    X-Ray Ankle Complete Right    Result Date: 12/20/2024  EXAMINATION: XR ANKLE COMPLETE 3 VIEW RIGHT CLINICAL HISTORY: Pain in right foot TECHNIQUE: AP, lateral, and oblique images of the right ankle were performed. COMPARISON: 12/14/2024. FINDINGS: There is an obliquely oriented fracture of the distal fibula with little interval change in position and alignment when compared to the prior examination.  There also may be a fracture involving the tip of the medial malleolus and the bones demonstrate normal anatomic alignment.  There is no evidence for dislocation.  There is circumferential splint material now present.  Calcaneal spur is noted at the insertion of the Achilles tendon.  There is soft tissue swelling particularly overlying the lateral ankle.     Fractures of the distal fibula and tip of the medial malleolus with no detrimental interval change in position and alignment of the fracture fragments when compared to 12/14/2024. Soft tissue swelling overlying the lateral ankle. Electronically signed by: Jeff Alfonso MD Date:    12/20/2024 Time:    10:44    X-Ray Ankle Complete Right    Result Date: 12/14/2024  EXAMINATION: XR ANKLE COMPLETE 3 VIEW RIGHT  CLINICAL HISTORY: Unspecified fall, initial encounter TECHNIQUE: AP, lateral, and oblique images of the right ankle were performed. COMPARISON: None FINDINGS: Obliquely oriented fracture through the lateral malleolus extending to the tibial plafond. Mildly displaced fracture at the tip of the medial malleolus. Talar dome appears intact.  Ankle mortise symmetric.  No syndesmotic widening. No large joint effusion. No radiopaque foreign body.  Enthesopathic change at the calcaneus.     Acute appearing bimalleolar fracture, as discussed. Electronically signed by: Antoine Craft Date:    12/14/2024 Time:    10:57

## 2025-01-24 ENCOUNTER — TELEPHONE (OUTPATIENT)
Dept: PODIATRY | Facility: CLINIC | Age: 65
End: 2025-01-24
Payer: MEDICARE

## 2025-01-24 ENCOUNTER — PATIENT MESSAGE (OUTPATIENT)
Dept: FAMILY MEDICINE | Facility: CLINIC | Age: 65
End: 2025-01-24

## 2025-01-24 ENCOUNTER — OFFICE VISIT (OUTPATIENT)
Dept: FAMILY MEDICINE | Facility: CLINIC | Age: 65
End: 2025-01-24
Payer: MEDICARE

## 2025-01-24 DIAGNOSIS — H10.32 ACUTE BACTERIAL CONJUNCTIVITIS OF LEFT EYE: Primary | ICD-10-CM

## 2025-01-24 RX ORDER — ERYTHROMYCIN 5 MG/G
OINTMENT OPHTHALMIC 4 TIMES DAILY
Qty: 3.5 G | Refills: 0 | Status: SHIPPED | OUTPATIENT
Start: 2025-01-24 | End: 2025-01-24

## 2025-01-24 NOTE — PROGRESS NOTES
"The patient location is: [place]  The chief complaint leading to consultation is: Eye crusting    Visit type: audiovisual    Face to Face time with patient: 8   20 minutes of total time spent on the encounter, which includes face to face time and non-face to face time preparing to see the patient (eg, review of tests), Obtaining and/or reviewing separately obtained history, Documenting clinical information in the electronic or other health record, Independently interpreting results (not separately reported) and communicating results to the patient/family/caregiver, or Care coordination (not separately reported).         Each patient to whom he or she provides medical services by telemedicine is:  (1) informed of the relationship between the physician and patient and the respective role of any other health care provider with respect to management of the patient; and (2) notified that he or she may decline to receive medical services by telemedicine and may withdraw from such care at any time.    Notes:        Subjective:        Chief Complaint: Eye Problem     Sandee Barriga is a 64 y.o. female, patient of Man Glass MD, presents today with complaints of Eye Problem      Eye Problem   The left eye is affected. This is a new problem. The current episode started today. The patient is experiencing no pain. There is No known exposure to pink eye. She Does not wear contacts. Associated symptoms include an eye discharge (crusted shut this AM; "goopy" drainage consistent through the day), eye redness and itching. Pertinent negatives include no blurred vision, double vision, fever, foreign body sensation, photophobia, vomiting or weakness. She has tried water for the symptoms.   Patient suspects she has "pink eye". Does not wear contact. Denies injury or trauma.     Past Medical History:   Diagnosis Date    Anxiety     Arthritis     osteoarthritis    Asthma     Closed fracture of distal end of right fibula  " "   Hypertension     history of; was on meds but blood pressure resolved due to    Insomnia     Major depressive disorder     Panic disorder with agoraphobia     none recent due to taking medication for    Pseudodementia     resolved with medication changes/increase    Syndesmotic disruption of right ankle        Past Surgical History:   Procedure Laterality Date    BILATERAL TUBAL LIGATION      FIXATION OF SYNDESMOSIS OF ANKLE Right 12/20/2024    Procedure: FIXATION, SYNDESMOSIS, ANKLE;  Surgeon: Fredis Dorantes Jr., DPM;  Location: [facility] OR;  Service: Podiatry;  Laterality: Right;    MOUTH SURGERY  06/2023    removal of all teeth    OPEN REDUCTION AND INTERNAL FIXATION (ORIF) OF INJURY OF ANKLE Right 12/20/2024    Procedure: ORIF, ANKLE;  Surgeon: Fredis Dorantes Jr., DPM;  Location: [facility] OR;  Service: Podiatry;  Laterality: Right;  pop saph    OTHER SURGICAL HISTORY Right     bitten by brown recluse spider and surgery to remove dead flesh from leg    TONSILLECTOMY      at age 4       Review of Systems   Constitutional:  Negative for activity change, fever and unexpected weight change.   HENT:  Negative for hearing loss, rhinorrhea and trouble swallowing.    Eyes:  Positive for discharge (crusted shut this AM; "goopy" drainage consistent through the day), redness and itching. Negative for blurred vision, double vision, photophobia and visual disturbance.   Respiratory:  Negative for chest tightness and wheezing.    Cardiovascular:  Negative for chest pain and palpitations.   Gastrointestinal:  Negative for blood in stool, constipation, diarrhea and vomiting.   Endocrine: Negative for polydipsia and polyuria.   Genitourinary:  Negative for difficulty urinating, dysuria, hematuria and menstrual problem.   Musculoskeletal:  Negative for arthralgias, joint swelling and neck pain.   Neurological:  Negative for weakness and headaches.   Psychiatric/Behavioral:  Negative for confusion and dysphoric mood.  "         Objective:     There were no vitals filed for this visit.       Physical Exam  Vitals reviewed: Virtual visit. No VS taken..   Constitutional:       General: She is awake. She is not in acute distress.     Appearance: She is not ill-appearing.   Eyes:      Conjunctiva/sclera:      Left eye: Left conjunctiva is injected.      Comments: Assessment of eye limited 2/2 virtual visit.    Pulmonary:      Effort: Pulmonary effort is normal. No respiratory distress.   Neurological:      Mental Status: She is alert.   Psychiatric:         Behavior: Behavior is cooperative.           Assessment:         ICD-10-CM ICD-9-CM   1. Acute bacterial conjunctivitis of left eye  H10.32 372.03       Plan:       Acute bacterial conjunctivitis of left eye  -     erythromycin (ROMYCIN) ophthalmic ointment; Place into the left eye 4 (four) times daily. Place 0.5 inch strip of ointment to lower lid. for 7 days  Dispense: 3.5 g; Refill: 0  -RTC or follow up with PCP if symptoms worsen or not improving with treatment.         Follow up if symptoms worsen or fail to improve.    KATHERYN Yoon-SHENG  Family Medicine  [facility]

## 2025-01-24 NOTE — TELEPHONE ENCOUNTER
Called patient, no answer. Left voicemail stating new appt on 1/31/25 after old appt cancelled due to weather. Provided callback number.

## 2025-01-28 ENCOUNTER — PATIENT MESSAGE (OUTPATIENT)
Dept: PODIATRY | Facility: CLINIC | Age: 65
End: 2025-01-28
Payer: MEDICARE

## 2025-01-31 ENCOUNTER — OFFICE VISIT (OUTPATIENT)
Dept: PODIATRY | Facility: CLINIC | Age: 65
End: 2025-01-31
Payer: MEDICARE

## 2025-01-31 DIAGNOSIS — Z47.89 AFTERCARE FOLLOWING SURGERY OF THE MUSCULOSKELETAL SYSTEM: Primary | ICD-10-CM

## 2025-01-31 PROCEDURE — 1126F AMNT PAIN NOTED NONE PRSNT: CPT | Mod: CPTII,S$GLB,, | Performed by: PODIATRIST

## 2025-01-31 PROCEDURE — 99999 PR PBB SHADOW E&M-EST. PATIENT-LVL III: CPT | Mod: PBBFAC,,, | Performed by: PODIATRIST

## 2025-01-31 PROCEDURE — 1101F PT FALLS ASSESS-DOCD LE1/YR: CPT | Mod: CPTII,S$GLB,, | Performed by: PODIATRIST

## 2025-01-31 PROCEDURE — 3288F FALL RISK ASSESSMENT DOCD: CPT | Mod: CPTII,S$GLB,, | Performed by: PODIATRIST

## 2025-01-31 PROCEDURE — 1159F MED LIST DOCD IN RCRD: CPT | Mod: CPTII,S$GLB,, | Performed by: PODIATRIST

## 2025-01-31 PROCEDURE — 99024 POSTOP FOLLOW-UP VISIT: CPT | Mod: S$GLB,,, | Performed by: PODIATRIST

## 2025-01-31 RX ORDER — GABAPENTIN 100 MG/1
100 CAPSULE ORAL 3 TIMES DAILY
Qty: 90 CAPSULE | Refills: 1 | Status: CANCELLED | OUTPATIENT
Start: 2025-01-31 | End: 2025-04-01

## 2025-01-31 RX ORDER — MELOXICAM 7.5 MG/1
7.5 TABLET ORAL DAILY
Qty: 30 TABLET | Refills: 1 | Status: CANCELLED | OUTPATIENT
Start: 2025-01-31 | End: 2025-04-01

## 2025-01-31 RX ORDER — GABAPENTIN 100 MG/1
100 CAPSULE ORAL 2 TIMES DAILY
Qty: 60 CAPSULE | Refills: 1 | Status: SHIPPED | OUTPATIENT
Start: 2025-01-31 | End: 2025-04-01

## 2025-01-31 NOTE — PROGRESS NOTES
[facility] - PODIATRY  [address] [facility]  [address]  [facility] [address]  Dept: [phone]  Dept Fax: [phone]    Fredis Dorantes Jr., DPM   Fredis Dorantes Jr.     Post-Operative Visit  Assessment:     1. Aftercare following surgery of the musculoskeletal system  X-Ray Ankle Complete Right    Ambulatory Referral/Consult to Physical Therapy/Occupational Therapy    gabapentin (NEURONTIN) 100 MG capsule          Plan:   MDM    Coding  5 wk s/p     - pt seen evaluated and managed  - XR reviewed   Normal postop changes s/ p ORIF R ankle fracture with repair of syndesmosis. Hardware in place and intact. Fracture remains reduced and improved compared to preoperative images. Bone callus observed on 0 views indicative of incomplete osseous healing at fracture site.    - wb: nwb RLE in [facility]  - rx dispensed: neurontin renewed  - cont at home ROM  - referrals: PT  -XR b4 nxt visit      Follow up in about 4 weeks (around 2/28/2025).      Subjective:      Patient ID: Sandee Barriga is a 64 y.o. female.    Chief Complaint:   Chief Complaint   Patient presents with    Post-op Evaluation     There were no vitals filed for this visit.      DOS: 12/20/24  Procedure: ORIF R ankle fx + repair of syndesmosis    Sandee Barriga returns to the clinic today for a postop visit. Pt is s/p above procedure. Sandee Barriga rates pain at a 6/10 on visual analog scale. Denies n/v/f/c.    HPI      Outside reports reviewed: historical medical records.    Past Medical History:   Diagnosis Date    Anxiety     Arthritis     osteoarthritis    Asthma     Closed fracture of distal end of right fibula     Hypertension     history of; was on meds but blood pressure resolved due to    Insomnia     Major depressive disorder     Panic disorder with agoraphobia     none recent due to taking medication for    Pseudodementia     resolved with medication changes/increase    Syndesmotic disruption of right ankle      Past Surgical  History:   Procedure Laterality Date    BILATERAL TUBAL LIGATION      FIXATION OF SYNDESMOSIS OF ANKLE Right 12/20/2024    Procedure: FIXATION, SYNDESMOSIS, ANKLE;  Surgeon: Fredis Dorantes Jr., DPM;  Location: [facility] OR;  Service: Podiatry;  Laterality: Right;    MOUTH SURGERY  06/2023    removal of all teeth    OPEN REDUCTION AND INTERNAL FIXATION (ORIF) OF INJURY OF ANKLE Right 12/20/2024    Procedure: ORIF, ANKLE;  Surgeon: Fredis Dorantes Jr., DPM;  Location: [facility] OR;  Service: Podiatry;  Laterality: Right;  pop saph    OTHER SURGICAL HISTORY Right     bitten by brown recluse spider and surgery to remove dead flesh from leg    TONSILLECTOMY      at age 4     No family history on file.  Current Outpatient Medications   Medication Sig Dispense Refill    ascorbic acid, vitamin C, (VITAMIN C) 500 MG tablet Take 1 tablet (500 mg total) by mouth once daily. 30 tablet 1    aspirin 325 MG tablet Take 1 tablet (325 mg total) by mouth 2 (two) times a day. 120 tablet 0    busPIRone (BUSPAR) 10 MG tablet Take 10 mg by mouth once daily.      busPIRone (BUSPAR) 10 MG tablet Take 5 mg by mouth every evening.      calcium-vitamin D tablet 600 mg-200 units Take 1 tablet by mouth 2 (two) times daily. Hold until after sx      clonazePAM (KLONOPIN) 1 MG tablet Take 1 mg by mouth daily as needed for Anxiety.      erythromycin (ROMYCIN) ophthalmic ointment Place into the left eye 4 (four) times daily. Place 0.5 inch strip of ointment to lower lid. for 7 days 3.5 g 0    montelukast (SINGULAIR) 10 mg tablet Take 10 mg by mouth once daily.      ondansetron (ZOFRAN-ODT) 4 MG TbDL Take 2 tablets (8 mg total) by mouth every 8 (eight) hours as needed (nausea). 30 tablet 0    venlafaxine 225 mg TR24 Take 1 tablet by mouth Daily.      gabapentin (NEURONTIN) 100 MG capsule Take 1 capsule (100 mg total) by mouth 2 (two) times daily. 60 capsule 1     No current facility-administered medications for this visit.     Review of patient's  allergies indicates:   Allergen Reactions    Shellfish containing products Anaphylaxis    Ambien [zolpidem] Hallucinations    Iodine Hives    Cinnamon analogues Other (See Comments)     sneezing    Cottonseed oil Photosensitivity and Rash     sneezing    House dust Other (See Comments)     sneezing    Ragweed Other (See Comments)     As well as other weeds/trees-sneezing     Social History     Socioeconomic History    Marital status:    Tobacco Use    Smoking status: Never    Smokeless tobacco: Never   Substance and Sexual Activity    Alcohol use: Yes     Comment: occasionally    Drug use: Yes     Types: Marijuana     Comment: occasional as needed for arthritis pain    Sexual activity: Yes     Partners: Male     Social Drivers of Health     Financial Resource Strain: Low Risk  (12/24/2024)    Overall Financial Resource Strain (CARDIA)     Difficulty of Paying Living Expenses: Not very hard   Food Insecurity: Food Insecurity Present (12/24/2024)    Hunger Vital Sign     Worried About Running Out of Food in the Last Year: Sometimes true     Ran Out of Food in the Last Year: Sometimes true   Physical Activity: Insufficiently Active (12/24/2024)    Exercise Vital Sign     Days of Exercise per Week: 4 days     Minutes of Exercise per Session: 30 min   Stress: Stress Concern Present (12/24/2024)    [employer] [place] [employer] - Occupational Stress Questionnaire     Feeling of Stress : Rather much   Housing Stability: Unknown (12/24/2024)    Housing Stability Vital Sign     Unable to Pay for Housing in the Last Year: No       ROS  REVIEW OF SYSTEMS: Negative as documented below as well as positive findings in bold.       Constitutional  Respiratory  Gastrointestinal  Skin   - Fever - Cough - Heartburn - Rash   - Chills - Spit blood - Nausea - Itching   - Weight Loss - Shortness of breath - Vomiting - Nail pain   - Malaise/Fatigue - Wheezing - Abdominal Pain  Wound/Ulcer   - Weight Gain   - Blood in  Stool         - Diarrhea          Cardiovascular  Genitourinary  Neurological  HEENT   - Chest Pain - Dysuria - Dizziness - Headache   - Palpitations - Hematuria - Tingling - Congestion   - Pain at night in legs - Flank Pain - Tremor - Sore Throat   - Cramping   - Sensory Change - Blurred Vision   - Leg Swelling   - Speech Change - Double Vision   - Dizzy when standing   - Focal Weakness - Eye Redness       - Seizures - Dry Eyes       - Loss of Consciousness          Endocrine  Musculoskeletal  Psychiatric   - Cold intolerance - Muscle Pain - Depression   - Heat intolerance - Neck Pain - Insomnia   - Anemia - Joint Pain - Memory Loss   -  Easy bruising, bleeding - Heel pain - Anxiety      Toe Pain        Leg/Ankle/Foot Pain         Objective:     There were no vitals taken for this visit.    Physical Exam    Neck:  Trachea Midline  No visible masses    Respiratory/Ears:  No distress or labored breathing.  Able to differentiate between normal talking voice and whisper.  Able to follow commands    Eyes:  Visual Acuity intact  No discoloration noted.    Physical Exam  Ortho Exam  Foot Exam    R LE exam con't:  V: DP 2/4, PT 2/4, CRT< 3s to all digits tested.    N: SILT in SP/DP/T/Darlyn/Saph distributions    Ortho: +Motor EHL/FHL/TA/GA   Surgical site pain present and mild   Surgical site swelling present and mild   Compartments soft/compressible. No pain on passive stretch of big toe. No calf  Pain.     Derm: Skin intact. Sutures/staples: removed. Signs of infection: none.     Imaging / Labs:    Lab Results   Component Value Date    WBC 7.22 12/18/2024    PREALBUMIN 20 12/18/2024       Lab Results   Component Value Date    PREALBUMIN 20 12/18/2024       X-Ray Ankle Complete Right    Result Date: 12/20/2024  EXAMINATION: XR ANKLE COMPLETE 3 VIEW RIGHT CLINICAL HISTORY: postop; TECHNIQUE: AP, lateral, and oblique images of the right ankle were performed. COMPARISON: 12/18/2024. FINDINGS: Postoperative radiographs of the  right ankle were obtained in this patient status post open reduction and internal fixation of the right distal fibula with fixation plate and screws in place.  A screw also traverses the distal tibiofibular syndesmosis.  There are skin staples present.  Small amount of soft tissue air is present consistent with patient's recent surgery.  There is irregularity at the tip of the medial malleolus consistent with patient's history of bimalleolar fracture.  Calcaneal spurs are present at the insertions of the Achilles tendon and plantar fascia.     Status post ORIF of the distal fibula with screw traversing the distal tibiofibular syndesmosis as well. Irregularity of the tip of the medial malleolus consistent with patient's history of bimalleolar fracture. Calcaneal spurs. Electronically signed by: Jeff Alfonso MD Date:    12/20/2024 Time:    16:44    SURG FL Surgery Fluoro Usage    Result Date: 12/20/2024  See OP Notes for results. IMPRESSION: See OP Notes for results. This procedure was auto-finalized by: Virtual Radiologist    X-Ray Chest PA And Lateral    Result Date: 12/20/2024  EXAMINATION: XR CHEST PA AND LATERAL CLINICAL HISTORY: Displaced bimalleolar fracture of right lower leg, initial encounter for closed fracture TECHNIQUE: PA and lateral views of the chest were performed. COMPARISON: None FINDINGS: The cardiac silhouette and superior mediastinal structures are unremarkable. Pulmonary vasculature is within normal limits. The lungs are well aerated and free of focal consolidations. There is no evidence for pneumothorax or pleural effusions. Bony structures are grossly intact.     No acute chest disease identified. Electronically signed by: Jeff Alfonso MD Date:    12/20/2024 Time:    10:50    X-Ray Foot Complete Right    Result Date: 12/20/2024  EXAMINATION: XR FOOT COMPLETE 3 VIEW RIGHT CLINICAL HISTORY: . Pain in right foot TECHNIQUE: Weightbearing AP, lateral, and oblique views of the right foot were  performed. COMPARISON: None FINDINGS: The bones of the right foot appear intact.  There is no evidence for acute fracture or bone destruction.  There are degenerative changes within the small joints of the foot, particularly at the 1st metatarsophalangeal joint.  No bony erosions are identified.  There is mild hallux valgus deformity.  There are calcaneal spurs at the insertions of the Achilles tendon and plantar fascia.  There is circumferential splint material present in this patient with history of bimalleolar fracture.     Circumferential splint in this patient with history of by a malleolar fracture. Bones of the foot appear intact. Mild degenerative changes. Mild hallux valgus deformity. Calcaneal spurs. Electronically signed by: Jeff Alfonso MD Date:    12/20/2024 Time:    10:49    X-Ray Ankle Complete Right    Result Date: 12/20/2024  EXAMINATION: XR ANKLE COMPLETE 3 VIEW RIGHT CLINICAL HISTORY: Pain in right foot TECHNIQUE: AP, lateral, and oblique images of the right ankle were performed. COMPARISON: 12/14/2024. FINDINGS: There is an obliquely oriented fracture of the distal fibula with little interval change in position and alignment when compared to the prior examination.  There also may be a fracture involving the tip of the medial malleolus and the bones demonstrate normal anatomic alignment.  There is no evidence for dislocation.  There is circumferential splint material now present.  Calcaneal spur is noted at the insertion of the Achilles tendon.  There is soft tissue swelling particularly overlying the lateral ankle.     Fractures of the distal fibula and tip of the medial malleolus with no detrimental interval change in position and alignment of the fracture fragments when compared to 12/14/2024. Soft tissue swelling overlying the lateral ankle. Electronically signed by: Jeff Alfonso MD Date:    12/20/2024 Time:    10:44    X-Ray Ankle Complete Right    Result Date: 12/14/2024  EXAMINATION: XR  ANKLE COMPLETE 3 VIEW RIGHT CLINICAL HISTORY: Unspecified fall, initial encounter TECHNIQUE: AP, lateral, and oblique images of the right ankle were performed. COMPARISON: None FINDINGS: Obliquely oriented fracture through the lateral malleolus extending to the tibial plafond. Mildly displaced fracture at the tip of the medial malleolus. Talar dome appears intact.  Ankle mortise symmetric.  No syndesmotic widening. No large joint effusion. No radiopaque foreign body.  Enthesopathic change at the calcaneus.     Acute appearing bimalleolar fracture, as discussed. Electronically signed by: Antoine Craft Date:    12/14/2024 Time:    10:57

## 2025-02-03 ENCOUNTER — PATIENT MESSAGE (OUTPATIENT)
Dept: PODIATRY | Facility: CLINIC | Age: 65
End: 2025-02-03
Payer: MEDICARE

## 2025-02-03 ENCOUNTER — TELEPHONE (OUTPATIENT)
Dept: PODIATRY | Facility: CLINIC | Age: 65
End: 2025-02-03
Payer: MEDICARE

## 2025-02-03 NOTE — TELEPHONE ENCOUNTER
Called and spoke to patient responding to portal message. Moved appt to a Tuesday as request. Informed I will message through portal on Dr. Dorantes's response in regards to when to start PT. Patient understood.

## 2025-02-12 ENCOUNTER — PATIENT MESSAGE (OUTPATIENT)
Dept: PODIATRY | Facility: CLINIC | Age: 65
End: 2025-02-12
Payer: MEDICARE

## 2025-02-18 ENCOUNTER — OFFICE VISIT (OUTPATIENT)
Dept: PODIATRY | Facility: CLINIC | Age: 65
End: 2025-02-18
Payer: MEDICARE

## 2025-02-18 DIAGNOSIS — Z47.89 AFTERCARE FOLLOWING SURGERY OF THE MUSCULOSKELETAL SYSTEM: Primary | ICD-10-CM

## 2025-02-18 NOTE — PROGRESS NOTES
[facility] - PODIATRY  [address] [facility]  [address]  [facility] [address]  Dept: [phone]  Dept Fax: [phone]    Fredis Dorantes Jr., DPM   Fredis Dorantes Jr.     Post-Operative Visit  Assessment:     1. Aftercare following surgery of the musculoskeletal system            Plan:   MDM    Coding  9 wk s/p     - pt seen evaluated and managed  - XR reviewed   Normal postop changes s/ p ORIF R ankle fracture with repair of syndesmosis. Hardware in place and intact. Fracture remains reduced and improved compared to preoperative images. Bone callus observed on 2 views indicative of some osseous healing at fracture site.    - wb: wbat in [facility] x 2 wk, then wbat in [facility] x 2wk  - rx dispensed: none  - cont at home ROM  - referrals: none  Cont PT  -XR A b4 nxt visit      Follow up in about 3 weeks (around 3/11/2025).      Subjective:      Patient ID: Sandee Barriga is a 64 y.o. female.    Chief Complaint:   No chief complaint on file.    There were no vitals filed for this visit.      DOS: 12/20/24  Procedure: ORIF R ankle fx + repair of syndesmosis    Sandee Barriga returns to the clinic today for a postop visit. Pt is s/p above procedure. Sandee Barriga rates pain at a 3/10 on visual analog scale. Denies n/v/f/c. Doing well in formal PT.    HPI      Outside reports reviewed: historical medical records.    Past Medical History:   Diagnosis Date    Anxiety     Arthritis     osteoarthritis    Asthma     Closed fracture of distal end of right fibula     Hypertension     history of; was on meds but blood pressure resolved due to    Insomnia     Major depressive disorder     Panic disorder with agoraphobia     none recent due to taking medication for    Pseudodementia     resolved with medication changes/increase    Syndesmotic disruption of right ankle      Past Surgical History:   Procedure Laterality Date    BILATERAL TUBAL LIGATION      FIXATION OF SYNDESMOSIS OF ANKLE Right 12/20/2024     Procedure: FIXATION, SYNDESMOSIS, ANKLE;  Surgeon: Fredis Dorantes Jr., DPM;  Location: [facility] OR;  Service: Podiatry;  Laterality: Right;    MOUTH SURGERY  06/2023    removal of all teeth    OPEN REDUCTION AND INTERNAL FIXATION (ORIF) OF INJURY OF ANKLE Right 12/20/2024    Procedure: ORIF, ANKLE;  Surgeon: Fredis Dorantes Jr., DPM;  Location: [facility] OR;  Service: Podiatry;  Laterality: Right;  pop saph    OTHER SURGICAL HISTORY Right     bitten by brown recluse spider and surgery to remove dead flesh from leg    TONSILLECTOMY      at age 4     No family history on file.  Current Outpatient Medications   Medication Sig Dispense Refill    ascorbic acid, vitamin C, (VITAMIN C) 500 MG tablet Take 1 tablet (500 mg total) by mouth once daily. 30 tablet 1    aspirin 325 MG tablet Take 1 tablet (325 mg total) by mouth 2 (two) times a day. 120 tablet 0    busPIRone (BUSPAR) 10 MG tablet Take 10 mg by mouth once daily.      busPIRone (BUSPAR) 10 MG tablet Take 5 mg by mouth every evening.      calcium-vitamin D tablet 600 mg-200 units Take 1 tablet by mouth 2 (two) times daily. Hold until after sx      clonazePAM (KLONOPIN) 1 MG tablet Take 1 mg by mouth daily as needed for Anxiety.      gabapentin (NEURONTIN) 100 MG capsule Take 1 capsule (100 mg total) by mouth 2 (two) times daily. 60 capsule 1    montelukast (SINGULAIR) 10 mg tablet Take 10 mg by mouth once daily.      ondansetron (ZOFRAN-ODT) 4 MG TbDL Take 2 tablets (8 mg total) by mouth every 8 (eight) hours as needed (nausea). 30 tablet 0    venlafaxine 225 mg TR24 Take 1 tablet by mouth Daily.       No current facility-administered medications for this visit.     Review of patient's allergies indicates:   Allergen Reactions    Shellfish containing products Anaphylaxis    Ambien [zolpidem] Hallucinations    Iodine Hives    Cinnamon analogues Other (See Comments)     sneezing    Cottonseed oil Photosensitivity and Rash     sneezing    House dust Other (See Comments)      sneezing    Ragweed Other (See Comments)     As well as other weeds/trees-sneezing     Social History     Socioeconomic History    Marital status:    Tobacco Use    Smoking status: Never    Smokeless tobacco: Never   Substance and Sexual Activity    Alcohol use: Yes     Comment: occasionally    Drug use: Yes     Types: Marijuana     Comment: occasional as needed for arthritis pain    Sexual activity: Yes     Partners: Male     Social Drivers of Health     Financial Resource Strain: Medium Risk (2/12/2025)    Overall Financial Resource Strain (CARDIA)     Difficulty of Paying Living Expenses: Somewhat hard   Food Insecurity: Food Insecurity Present (2/12/2025)    Hunger Vital Sign     Worried About Running Out of Food in the Last Year: Often true     Ran Out of Food in the Last Year: Often true   Transportation Needs: No Transportation Needs (2/12/2025)    PRAPARE - Transportation     Lack of Transportation (Medical): No     Lack of Transportation (Non-Medical): No   Physical Activity: Sufficiently Active (2/12/2025)    Exercise Vital Sign     Days of Exercise per Week: 5 days     Minutes of Exercise per Session: 40 min   Recent Concern: Physical Activity - Insufficiently Active (12/24/2024)    Exercise Vital Sign     Days of Exercise per Week: 4 days     Minutes of Exercise per Session: 30 min   Stress: Stress Concern Present (2/12/2025)    [employer] [place] [employer] - Occupational Stress Questionnaire     Feeling of Stress : Very much   Housing Stability: Low Risk  (2/12/2025)    Housing Stability Vital Sign     Unable to Pay for Housing in the Last Year: No     Number of Times Moved in the Last Year: 0     Homeless in the Last Year: No       ROS  REVIEW OF SYSTEMS: Negative as documented below as well as positive findings in bold.       Constitutional  Respiratory  Gastrointestinal  Skin   - Fever - Cough - Heartburn - Rash   - Chills - Spit blood - Nausea - Itching   - Weight Loss -  Shortness of breath - Vomiting - Nail pain   - Malaise/Fatigue - Wheezing - Abdominal Pain  Wound/Ulcer   - Weight Gain   - Blood in Stool         - Diarrhea          Cardiovascular  Genitourinary  Neurological  HEENT   - Chest Pain - Dysuria - Dizziness - Headache   - Palpitations - Hematuria - Tingling - Congestion   - Pain at night in legs - Flank Pain - Tremor - Sore Throat   - Cramping   - Sensory Change - Blurred Vision   - Leg Swelling   - Speech Change - Double Vision   - Dizzy when standing   - Focal Weakness - Eye Redness       - Seizures - Dry Eyes       - Loss of Consciousness          Endocrine  Musculoskeletal  Psychiatric   - Cold intolerance - Muscle Pain - Depression   - Heat intolerance - Neck Pain - Insomnia   - Anemia - Joint Pain - Memory Loss   -  Easy bruising, bleeding - Heel pain - Anxiety      Toe Pain        Leg/Ankle/Foot Pain         Objective:     There were no vitals taken for this visit.    Physical Exam    Neck:  Trachea Midline  No visible masses    Respiratory/Ears:  No distress or labored breathing.  Able to differentiate between normal talking voice and whisper.  Able to follow commands    Eyes:  Visual Acuity intact  No discoloration noted.    Physical Exam  Ortho Exam  Foot Exam    R LE exam con't:  V: DP 2/4, PT 2/4, CRT< 3s to all digits tested.    N: SILT in SP/DP/T/Darlyn/Saph distributions    Ortho: +Motor EHL/FHL/TA/GA   Surgical site pain present and mild   Surgical site swelling absent   Ankle and syndes ROM pain free   Compartments soft/compressible. No pain on passive stretch of big toe. No calf  Pain.     Derm: Skin intact. Sutures/staples: removed. Signs of infection: none.     Imaging / Labs:    Lab Results   Component Value Date    WBC 7.22 12/18/2024    PREALBUMIN 20 12/18/2024       Lab Results   Component Value Date    PREALBUMIN 20 12/18/2024         X-Ray Ankle Complete Right  Result Date: 1/31/2025  EXAMINATION: XR ANKLE COMPLETE 3 VIEW RIGHT CLINICAL HISTORY:  Encounter for other orthopedic aftercare TECHNIQUE: AP, lateral, and oblique images of the right ankle were performed. FINDINGS: There is osseous spur at the Achilles and plantar fascia attachment calcaneus.  There is fixation hardware within the distal tibia and fibula with no evidence of hardware failure.  The ankle joint space is satisfactorily preserved.     As above. Electronically signed by: Jeff Kate MD Date:    01/31/2025 Time:    10:51

## 2025-03-17 ENCOUNTER — PATIENT MESSAGE (OUTPATIENT)
Dept: PODIATRY | Facility: CLINIC | Age: 65
End: 2025-03-17
Payer: MEDICARE

## 2025-03-19 ENCOUNTER — PATIENT MESSAGE (OUTPATIENT)
Dept: PODIATRY | Facility: CLINIC | Age: 65
End: 2025-03-19
Payer: MEDICARE

## 2025-03-27 PROBLEM — R29.898 DECREASED STRENGTH OF LOWER EXTREMITY: Status: ACTIVE | Noted: 2025-03-27

## 2025-03-27 PROBLEM — Z47.89: Status: RESOLVED | Noted: 2024-12-18 | Resolved: 2025-03-27

## 2025-03-27 PROBLEM — M25.671 DECREASED RANGE OF MOTION OF RIGHT ANKLE: Status: ACTIVE | Noted: 2025-03-27

## 2025-04-02 ENCOUNTER — PATIENT MESSAGE (OUTPATIENT)
Dept: PODIATRY | Facility: CLINIC | Age: 65
End: 2025-04-02

## 2025-04-03 ENCOUNTER — PATIENT MESSAGE (OUTPATIENT)
Dept: PODIATRY | Facility: CLINIC | Age: 65
End: 2025-04-03
Payer: MEDICARE

## 2025-04-07 ENCOUNTER — PATIENT MESSAGE (OUTPATIENT)
Dept: PODIATRY | Facility: CLINIC | Age: 65
End: 2025-04-07

## 2025-04-07 ENCOUNTER — PATIENT MESSAGE (OUTPATIENT)
Dept: PODIATRY | Facility: CLINIC | Age: 65
End: 2025-04-07
Payer: MEDICARE

## 2025-04-07 ENCOUNTER — OFFICE VISIT (OUTPATIENT)
Dept: PODIATRY | Facility: CLINIC | Age: 65
End: 2025-04-07
Payer: MEDICARE

## 2025-04-07 DIAGNOSIS — Z47.89 AFTERCARE FOLLOWING SURGERY OF THE MUSCULOSKELETAL SYSTEM: Primary | ICD-10-CM

## 2025-04-07 PROCEDURE — 99024 POSTOP FOLLOW-UP VISIT: CPT | Mod: S$GLB,,, | Performed by: PODIATRIST

## 2025-04-07 PROCEDURE — 99999 PR PBB SHADOW E&M-EST. PATIENT-LVL III: CPT | Mod: PBBFAC,,, | Performed by: PODIATRIST

## 2025-04-07 PROCEDURE — 1159F MED LIST DOCD IN RCRD: CPT | Mod: CPTII,S$GLB,, | Performed by: PODIATRIST

## 2025-04-07 NOTE — PROGRESS NOTES
[facility] - PODIATRY  [address]  [address]  [facility] [address]  Dept: [phone]  Dept Fax: [phone]    Fredis Dorantes Jr., DPM   Fredis Dorantes Jr.     Post-Operative Visit  Assessment:     1. Aftercare following surgery of the musculoskeletal system            Plan:   MDM    Coding  12 wk s/p     - pt seen evaluated and managed  - XR reviewed   Normal postop changes s/ p ORIF R ankle fracture with repair of syndesmosis. Hardware in place and intact. Fracture remains reduced and improved compared to preoperative images. Bone callus observed on 2 views indicative of some osseous healing at fracture site.    -clinically pt without pain and satisfied with result  -some postop swelling/edema/discomfort expected up to 1 yr postop    - wb: wbat   - rx dispensed: none  - cont at home ROM  - referrals: none  Cont PT  -XR A b4 nxt visit      Follow up if symptoms worsen or fail to improve.      Subjective:      Patient ID: Sandee Barriga is a 64 y.o. female.    Chief Complaint:   No chief complaint on file.    There were no vitals filed for this visit.      DOS: 12/20/24  Procedure: ORIF R ankle fx + repair of syndesmosis    Sandee Barriga returns to the clinic today for a postop visit. Pt is s/p above procedure. Sandee Barriga rates pain at a 0/10 on visual analog scale. Denies n/v/f/c. Mostly finished formal PT.    HPI      Outside reports reviewed: historical medical records.    Past Medical History:   Diagnosis Date    Anxiety     Arthritis     osteoarthritis    Asthma     Closed fracture of distal end of right fibula     Hypertension     history of; was on meds but blood pressure resolved due to    Insomnia     Major depressive disorder     Panic disorder with agoraphobia     none recent due to taking medication for    Pseudodementia     resolved with medication changes/increase    Syndesmotic disruption of right ankle      Past Surgical History:   Procedure Laterality Date     BILATERAL TUBAL LIGATION      FIXATION OF SYNDESMOSIS OF ANKLE Right 12/20/2024    Procedure: FIXATION, SYNDESMOSIS, ANKLE;  Surgeon: Fredis Dorantes Jr., DPM;  Location: [facility] OR;  Service: Podiatry;  Laterality: Right;    MOUTH SURGERY  06/2023    removal of all teeth    OPEN REDUCTION AND INTERNAL FIXATION (ORIF) OF INJURY OF ANKLE Right 12/20/2024    Procedure: ORIF, ANKLE;  Surgeon: Fredis Dorantes Jr., DPM;  Location: [facility] OR;  Service: Podiatry;  Laterality: Right;  pop saph    OTHER SURGICAL HISTORY Right     bitten by brown recluse spider and surgery to remove dead flesh from leg    TONSILLECTOMY      at age 4     No family history on file.  Current Outpatient Medications   Medication Sig Dispense Refill    aspirin 325 MG tablet Take 1 tablet (325 mg total) by mouth 2 (two) times a day. 120 tablet 0    busPIRone (BUSPAR) 10 MG tablet Take 10 mg by mouth once daily.      busPIRone (BUSPAR) 10 MG tablet Take 5 mg by mouth every evening.      calcium-vitamin D tablet 600 mg-200 units Take 1 tablet by mouth 2 (two) times daily. Hold until after sx      clonazePAM (KLONOPIN) 1 MG tablet Take 1 mg by mouth daily as needed for Anxiety.      gabapentin (NEURONTIN) 100 MG capsule Take 1 capsule (100 mg total) by mouth 2 (two) times daily. 60 capsule 1    montelukast (SINGULAIR) 10 mg tablet Take 10 mg by mouth once daily.      ondansetron (ZOFRAN-ODT) 4 MG TbDL Take 2 tablets (8 mg total) by mouth every 8 (eight) hours as needed (nausea). 30 tablet 0    venlafaxine 225 mg TR24 Take 1 tablet by mouth Daily.       No current facility-administered medications for this visit.     Review of patient's allergies indicates:   Allergen Reactions    Shellfish containing products Anaphylaxis    Ambien [zolpidem] Hallucinations    Iodine Hives    Cinnamon analogues Other (See Comments)     sneezing    Cottonseed oil Photosensitivity and Rash     sneezing    House dust Other (See Comments)     sneezing    Ragweed Other  (See Comments)     As well as other weeds/trees-sneezing     Social History     Socioeconomic History    Marital status:    Tobacco Use    Smoking status: Never    Smokeless tobacco: Never   Substance and Sexual Activity    Alcohol use: Yes     Comment: occasionally    Drug use: Yes     Types: Marijuana     Comment: occasional as needed for arthritis pain    Sexual activity: Yes     Partners: Male     Social Drivers of Health     Financial Resource Strain: Medium Risk (2/12/2025)    Overall Financial Resource Strain (CARDIA)     Difficulty of Paying Living Expenses: Somewhat hard   Food Insecurity: Food Insecurity Present (2/12/2025)    Hunger Vital Sign     Worried About Running Out of Food in the Last Year: Often true     Ran Out of Food in the Last Year: Often true   Transportation Needs: No Transportation Needs (2/12/2025)    PRAPARE - Transportation     Lack of Transportation (Medical): No     Lack of Transportation (Non-Medical): No   Physical Activity: Sufficiently Active (2/12/2025)    Exercise Vital Sign     Days of Exercise per Week: 5 days     Minutes of Exercise per Session: 40 min   Recent Concern: Physical Activity - Insufficiently Active (12/24/2024)    Exercise Vital Sign     Days of Exercise per Week: 4 days     Minutes of Exercise per Session: 30 min   Stress: Stress Concern Present (2/12/2025)    [employer] - Occupational Stress Questionnaire     Feeling of Stress : Very much   Housing Stability: Low Risk  (2/12/2025)    Housing Stability Vital Sign     Unable to Pay for Housing in the Last Year: No     Number of Times Moved in the Last Year: 0     Homeless in the Last Year: No       ROS  REVIEW OF SYSTEMS: Negative as documented below as well as positive findings in bold.       Constitutional  Respiratory  Gastrointestinal  Skin   - Fever - Cough - Heartburn - Rash   - Chills - Spit blood - Nausea - Itching   - Weight Loss - Shortness of breath - Vomiting -  Nail pain   - Malaise/Fatigue - Wheezing - Abdominal Pain  Wound/Ulcer   - Weight Gain   - Blood in Stool         - Diarrhea          Cardiovascular  Genitourinary  Neurological  HEENT   - Chest Pain - Dysuria - Dizziness - Headache   - Palpitations - Hematuria - Tingling - Congestion   - Pain at night in legs - Flank Pain - Tremor - Sore Throat   - Cramping   - Sensory Change - Blurred Vision   - Leg Swelling   - Speech Change - Double Vision   - Dizzy when standing   - Focal Weakness - Eye Redness       - Seizures - Dry Eyes       - Loss of Consciousness          Endocrine  Musculoskeletal  Psychiatric   - Cold intolerance - Muscle Pain - Depression   - Heat intolerance - Neck Pain - Insomnia   - Anemia - Joint Pain - Memory Loss   -  Easy bruising, bleeding - Heel pain - Anxiety      Toe Pain        Leg/Ankle/Foot Pain         Objective:     There were no vitals taken for this visit.    Physical Exam    Neck:  Trachea Midline  No visible masses    Respiratory/Ears:  No distress or labored breathing.  Able to differentiate between normal talking voice and whisper.  Able to follow commands    Eyes:  Visual Acuity intact  No discoloration noted.    Physical Exam  Ortho Exam  Foot Exam    R LE exam con't:  V: DP 2/4, PT 2/4, CRT< 3s to all digits tested.    N: SILT in SP/DP/T/Darlyn/Saph distributions    Ortho: +Motor EHL/FHL/TA/GA   Surgical site pain absent   Surgical site swelling absent   Ankle and syndes ROM pain free   Compartments soft/compressible. No pain on passive stretch of big toe. No calf  Pain.     Derm: Skin intact. Sutures/staples: removed. Signs of infection: none.       Imaging / Labs:    Lab Results   Component Value Date    WBC 7.22 12/18/2024    PREALBUMIN 20 12/18/2024       Lab Results   Component Value Date    PREALBUMIN 20 12/18/2024         No results found.

## 2025-04-07 NOTE — PATIENT INSTRUCTIONS
R.I.C.E.    R.I.C.E. stands for Rest, Ice, Compression, and Elevation. Doing these things helps limit pain and swelling after an injury. R.I.C.E. also helps injuries heal faster. Use R.I.C.E. for sprains, strains, and severe bruises or bumps. Follow the tips on this handout and begin R.I.C.E. as soon as possible after an injury.     Rest  Pain is your body's way of telling you to rest an injured area. Whether you have hurt an elbow, hand, foot, or knee, limiting its use will prevent further injury and help you heal.     Ice  Applying ice right after an injury helps prevent swelling and reduce pain. Don't place ice directly on your skin.  Wrap a cold pack or bag of ice in a thin cloth. Place it over the injured area.  Ice for 10 minutes every 3 hours. Don't ice for more than 20 minutes at a time.     Compression  Putting pressure (compression) on an injury helps prevent swelling and provides support.  Wrap the injured area firmly with an elastic bandage. If your hand or foot tingles, becomes discolored, or feels cold to the touch, the bandage may be too tight. Rewrap it more loosely.  If your bandage becomes too loose, rewrap it.  Do not wear an elastic bandage overnight.    Elevation  Keeping an injury elevated helps reduce swelling, pain, and throbbing. Elevation is most effective when the injury is kept elevated higher than the heart.     Call your healthcare provider if you notice any of the following:  Fingers or toes feel numb, are cold to the touch, or change color  Skin looks shiny or tight  Pain, swelling, or bruising worsens and is not improved with elevation         Bone Healing  How Does a Bone Heal?    All broken bones go through the same healing process. This is true whether a bone has been cut as part of a surgical procedure or fractured through an injury.     The bone healing process has three overlapping stages: inflammation, bone production and bone remodeling.        Inflammation starts immediately  after the bone is fractured and lasts for several days. When the bone is fractured, there is bleeding into the area, leading to inflammation and clotting of blood at the fracture site. This provides the initial structural stability and framework for producing new bone.        Bone production begins when the clotted blood formed by inflammation is replaced with fibrous tissue and cartilage (known as soft callus). As healing progresses, the soft callus is replaced with hard bone (known as hard callus), which is visible on x-rays several weeks after the fracture.        Bone remodeling, the final phase of bone healing, goes on for several months. In remodeling, bone continues to form and becomes compact, returning to its original shape. In addition, blood circulation in the area improves. Once adequate bone healing has occurred, weightbearing (such as standing or walking) encourages bone remodeling.?  How Long Does Bone Healing Take?   Bone generally takes 6 to 12 weeks to heal to a significant degree. In general, children's bones heal faster than those of adults. The foot and ankle surgeon will determine when the patient is ready to bear weight on the area. This will depend on the location and severity of the fracture, the type of surgical procedure performed and other considerations.    What Helps Promote Bone Healing?  If a bone will be cut during a planned surgical procedure, some steps can be taken pre- and postoperatively to help optimize healing. The surgeon may offer advice on diet and nutritional supplements that are essential to bone growth. Smoking cessation and adequate control of blood sugar levels in people living with diabetes are important. Smoking and high glucose levels interfere with bone healing.     For all patients with fractured bones, immobilization is a critical part of treatment because any movement of bone fragments slows down the initial healing process. Depending on the type of fracture or  surgical procedure, the surgeon may use some form of fixation (such as screws, plates or wires) on the fractured bone and/or a cast to keep the bone from moving. During the immobilization period, weightbearing is restricted as instructed by the surgeon.     Once the bone is adequately healed, physical therapy often plays a key role in rehabilitation. An exercise program designed for the patient can help in regaining strength and balance and can assist in returning to normal activities.    What Can Hinder Bone Healing?   A wide variety of factors can slow down the healing process. These include:    Movement of the bone fragments; weightbearing too soon  Smoking, which constricts the blood vessels and decreases circulation  Medical conditions, such as diabetes, hormone-related problems or vascular disease  Some medications, such as corticosteroids and other immunosuppressants  Fractures that are severe, complicated or become infected  Advanced age  Poor nutrition or impaired metabolism  Low levels of calcium and vitamin D     How Can Slow Healing Be Treated?   If the bone is not healing as well as expected or fails to heal, the foot and ankle surgeon can choose from a variety of treatment options to enhance bone growth, such as continued immobilization for a longer period, bone stimulation or surgery with bone grafting or use of bone growth proteins      Understanding an Ankle Fracture      The ankle is formed by bones in the lower leg (tibia and fibula) and the bone on top of the foot (talus). When you have a fracture of the ankle, it means that one or more of the bones in the ankle are broken. The bone may be cracked, broken into two or more pieces, or even shattered. The pieces of bone may be lined up or they may have moved out of place. Sometimes, the bone may break through the skin. Nearby ligaments may also be damaged. Depending on how badly the bone is broken, healing may take a few months or longer.   What  causes an ankle fracture?  Ankle fractures are often caused from severely twisting or rolling the ankle. They may also be caused from a fall, blow, accident, or sports injury.  Symptoms of an ankle fracture  Symptoms can include pain, swelling, and bruising. If the bone breaks through the skin, bleeding at the site can also occur. The ankle may look crooked, deformed, or bent. Also, it may be hard to move or use the ankle and foot as you would normally.  Treating an ankle fracture  Treatment for an ankle fracture depends on where the bone is broken and how serious the break is. If needed, the bone is put back into place. This may be done with or without surgery. If surgery is needed, the surgeon may use devices such as pins, plates, or screws to hold the bone together. Usually, you will wear a splint, brace, or short leg cast to keep the bone in place and protect it from injury during healing. Other treatments may also be used to help reduce symptoms or regain function. These include:  Rest. You may need to avoid walking or putting any weight on the broken ankle for a period of time. Severe fractures need a longer limit on weight-bearing activities.  Cold packs. Putting an ice pack over the injured area may help reduce swelling and pain.  Compression. An elastic bandage may be wrapped around the ankle to help reduce swelling.  Elevation. Propping up the ankle so that it is above your heart may ease swelling.  Pain medicines. Prescription or over-the-counter pain medicines may help reduce pain and swelling. If needed, stronger pain medicines may be prescribed.  Exercises. Your healthcare provider may give you certain exercises to do at home or with a physical therapist. These help restore strength, flexibility, and range of motion in the ankle and foot.  Possible complications of an ankle fracture  These can include:  Poor healing of the bone  Weakness, stiffness, or loss of range of motion in the  ankle  Osteoarthritis in the ankle  When to call your healthcare provider  Call your healthcare provider right away if you have any of these:  Fever of 100.4°F (38°C) or higher, or as directed  Symptoms that don't get better with treatment, or get worse  Numbness, tingling, or coldness in your foot or toes  Toenails that turn blue or grey in color  A splint, brace, or cast that is damaged or feels too tight or loose  Unusual redness, warmth, swelling, bleeding, or drainage from any wounds or incision sites  New symptoms     This information is not intended as a substitute for professional medical care. Always follow your healthcare professional's instructions.            Understanding Tibia/Fibula Fracture Open Reduction and Internal Fixation (ORIF)  Open reduction and internal fixation (ORIF) is a type of treatment to fix a broken bone. It puts the pieces of a broken bone back together so they can heal. Open reduction means the bones are put back in place during a surgery. Internal fixation means that special hardware is used to hold the bone pieces together. This helps the bone heal correctly. The procedure is done by an orthopedic surgeon. This is a doctor with special training in treating bone, joint, and muscle problems.  How does a tibia/fibula fracture happen?  The tibia and fibula are the 2 bones of your lower leg. The tibia is your shinbone. It's the larger bone. The fibula is the smaller bone that sits next to the tibia. The top of the tibia forms part of the knee joint. The bottom of both the tibia and the fibula form the upper part of the ankle joint.  An injury may break (fracture) your tibia or your fibula into 2 or more pieces. This might happen near your knee, in the middle of your shin, or near your ankle. A fracture near your ankle may be called a broken ankle. You may have a fracture in 1 or both of the bones. A bone may break but the pieces are still lined up correctly. Or the pieces may not line  up correctly. This is called a displaced fracture.  A broken tibia or fibula can happen from a car or bicycle accident, contact sports, a fall, or activities with movements that you do over and over again. Older adults with low bone density (osteoporosis) are more at risk for breaks in these bones.  Why is ORIF done?  During an open reduction, the bone pieces are put back in their proper alignment. The bones are then connected back in place with hardware. This is called internal fixation. The hardware may include screws, plates, rods, wires, or nails. This helps the bone heal correctly.  Most people do not need ORIF for tibia/fibula fractures. They may have bones put back in place without surgery. They may have pain medicine, a cast, splint, or a special brace, and physical therapy. You are likely to need ORIF if:  You have a displaced fracture.  Part of your tibia or fibula broke through the skin.  Your tibia or fibula broke into several pieces.  Your fracture involves your knee or ankle joint.  You had other treatment, but your fracture did not heal well.  How is a tibia/fibula fracture ORIF done?  The surgery is done by an orthopedic surgeon. The surgeon will make a cut (incision) through the skin and muscle of your leg. He or she will put the pieces of your tibia and fibula back into place (reduction). The pieces of the broken bones will be secured to each other (fixation). Your doctor may use screws, metal plates, wires, or pins. For a fracture in the middle of the tibia, a special metal shiloh may be put through the middle of the bone.  What are the risks of tibia/fibula fracture ORIF?  All surgery has risks. The risks of tibia/fibula fracture ORIF include:  Infection  Bleeding  Nerve damage  Bone healing out of alignment  Blood clots  Fat embolism  Broken screws or plates  Loss of movement  Damage to the bones from the hardware  Skin irritation from the hardware  Misaligned bones  Problems from anesthesia  Need  for additional surgery  Your risks vary based on your age and general health. For example, if you are a smoker or if you have low bone density, you may have a higher risk for certain problems. People with diabetes that is not controlled well may also have a higher risk for problems. Talk with your healthcare provider about which risks apply most to you.  This information is not intended as a substitute for professional medical care. Always follow your healthcare professional's instructions.        Having Tibia/Fibula Fracture Open Reduction and Internal Fixation (ORIF)  Open reduction and internal fixation (ORIF) is a type of treatment to fix a broken bone. It puts the pieces of a broken bone back together so they can heal. Open reduction means the bones are put back in place during a surgery. Internal fixation means that special hardware is used to hold the bone pieces together. This helps the bone heal correctly. The procedure is done by an orthopedic surgeon. This is a doctor with special training in treating bone, joint, and muscle problems.  What to tell your healthcare provider  Tell your healthcare provider about all the medicines you take. This includes over-the-counter medicines such as ibuprofen. It also includes vitamins, herbs, and other supplements. Also tell your provider the last time you had something to eat or drink. And tell your provider if you:  Have had any recent changes in your health, such as an infection or fever  Are sensitive or allergic to any medicines, latex, tape, or anesthesia (local and general)  Are pregnant or think you may be pregnant  Tests before your surgery  You may need an X-ray or other imaging scan to look at your bones.  Getting ready for your surgery  ORIF often takes place as emergency surgery after an accident or injury. Before this procedure, a healthcare provider will ask about your health history and give you a physical exam.  In some cases, femur fracture ORIF is  planned. You may need to have your leg placed in traction while you wait for surgery. Traction is a type of sling that holds your leg. Talk with your healthcare provider how to get ready for your surgery. You may need to stop taking some medicines before the procedure, such as blood thinners and aspirin. If you smoke, you may need to stop before your surgery. Smoking can delay healing. Talk with your healthcare provider if you need help to stop smoking.  Also, make sure to:  Ask a family member or friend to take you home from the hospital. You cannot drive yourself.  Plan some changes at home to help you recover. You may need help at home.  Not eat or drink after midnight the night before your surgery  Follow all other instructions from your healthcare provider  You may be asked to sign a consent form that gives your permission to do the procedure. Read the form carefully. Ask questions if something is not clear.  On the day of surgery  Your surgeon will explain the details of your surgery. These details will depend on the location and severity of your injury. An orthopedic surgeon and a team of specialized nurses will do the surgery. The preparation and surgery may take a couple of hours. In general, you can expect the following:  You will likely have general anesthesia.This will prevent pain and make you sleep through the surgery. Or you may have regional anesthesia to numb the area and medicine to help you relax and sleep through the surgery.  A healthcare provider watches your vital signs, like your heart rate and blood pressure, during the surgery.  After cleaning the skin, the surgeon will make a cut (incision) through the skin and muscle of your leg.  The surgeon will put the pieces of your tibia and fibula back into place (reduction).  He or she will secure the pieces of the broken bones to each other (fixation). The surgeon may use screws, metal plates, wires, or pins. For a fracture in the middle of our  tibia, a special metal shiloh may be put through the middle of the bone.  The surgeon will make other repairs to the area as needed.  He or she will close the layers of muscle and skin around your thigh with sutures.  After your surgery  Talk with your surgeon about what you can expect after your surgery. You may go home the same day. Or you may stay overnight in the hospital. Before leaving the hospital, you will likely have X-rays taken of your leg. This is to check the repair.  You will have some pain after the surgery. Your doctor will tell you what pain medicine you can take to help reduce the pain. You can also use ice packs to help lessen pain and swelling. You might have some fluid draining from your incision. This is normal.  You'll get instructions about how to move your leg and when you can put weight on it. For a while after your surgery, you may be told not to move your leg. You may need to wear a brace for several weeks. You may need to keep your leg dry.  Follow all of your doctor's instructions carefully. Your surgeon may tell you to:  Take prescription medicine to prevent blood clots  Not take certain over-the-counter medicines for pain that may interfere with bone healing  Eat foods high in calcium and vitamin D to help with bone healing  Follow-up care  Make sure to keep all of your follow-up appointments. You may need to have your stitches removed a week or so after your surgery.  You may have physical therapy to improve the strength and movement of your leg. The therapy may include treatments and exercises. The therapy improves your chances of a full recovery. Most people are able to return to all their normal activities within a few months.  When to call your healthcare provider  Call your healthcare provider right away if you have any of these:  Fever of 100.4°F (38°C) or higher  Redness, swelling, or fluid leaking from your incision that gets worse  Pain in your leg or calf that gets worse  Loss  of feeling in your foot or leg     This information is not intended as a substitute for professional medical care. Always follow your healthcare professional's instructions.

## 2025-04-16 ENCOUNTER — PATIENT MESSAGE (OUTPATIENT)
Dept: PODIATRY | Facility: CLINIC | Age: 65
End: 2025-04-16
Payer: MEDICARE